# Patient Record
Sex: MALE | NOT HISPANIC OR LATINO | Employment: OTHER | URBAN - METROPOLITAN AREA
[De-identification: names, ages, dates, MRNs, and addresses within clinical notes are randomized per-mention and may not be internally consistent; named-entity substitution may affect disease eponyms.]

---

## 2021-04-26 ENCOUNTER — DOCUMENTATION ONLY (OUTPATIENT)
Dept: OTHER | Facility: CLINIC | Age: 81
End: 2021-04-26

## 2021-07-20 ENCOUNTER — LAB REQUISITION (OUTPATIENT)
Dept: LAB | Facility: CLINIC | Age: 81
End: 2021-07-20
Payer: COMMERCIAL

## 2021-07-20 DIAGNOSIS — E55.9 VITAMIN D DEFICIENCY, UNSPECIFIED: ICD-10-CM

## 2021-07-20 DIAGNOSIS — F02.80 DEMENTIA IN OTHER DISEASES CLASSIFIED ELSEWHERE WITHOUT BEHAVIORAL DISTURBANCE: ICD-10-CM

## 2021-07-21 LAB
ANION GAP SERPL CALCULATED.3IONS-SCNC: 7 MMOL/L (ref 3–14)
BUN SERPL-MCNC: 11 MG/DL (ref 7–30)
CALCIUM SERPL-MCNC: 8.7 MG/DL (ref 8.5–10.1)
CHLORIDE BLD-SCNC: 105 MMOL/L (ref 94–109)
CO2 SERPL-SCNC: 28 MMOL/L (ref 20–32)
CREAT SERPL-MCNC: 0.72 MG/DL (ref 0.66–1.25)
GFR SERPL CREATININE-BSD FRML MDRD: 89 ML/MIN/1.73M2
GLUCOSE BLD-MCNC: 70 MG/DL (ref 70–99)
POTASSIUM BLD-SCNC: 3.4 MMOL/L (ref 3.4–5.3)
SODIUM SERPL-SCNC: 140 MMOL/L (ref 133–144)
TSH SERPL DL<=0.005 MIU/L-ACNC: 2.32 MU/L (ref 0.4–4)

## 2021-07-21 PROCEDURE — 36415 COLL VENOUS BLD VENIPUNCTURE: CPT | Mod: ORL | Performed by: NURSE PRACTITIONER

## 2021-07-21 PROCEDURE — 82306 VITAMIN D 25 HYDROXY: CPT | Mod: ORL | Performed by: NURSE PRACTITIONER

## 2021-07-21 PROCEDURE — 80048 BASIC METABOLIC PNL TOTAL CA: CPT | Mod: ORL | Performed by: NURSE PRACTITIONER

## 2021-07-21 PROCEDURE — 84443 ASSAY THYROID STIM HORMONE: CPT | Mod: ORL | Performed by: NURSE PRACTITIONER

## 2021-07-22 LAB — DEPRECATED CALCIDIOL+CALCIFEROL SERPL-MC: 73 UG/L (ref 20–75)

## 2021-07-29 ENCOUNTER — LAB REQUISITION (OUTPATIENT)
Dept: LAB | Facility: CLINIC | Age: 81
End: 2021-07-29
Payer: COMMERCIAL

## 2021-07-29 DIAGNOSIS — Z29.9 ENCOUNTER FOR PROPHYLACTIC MEASURES, UNSPECIFIED: ICD-10-CM

## 2021-07-29 PROCEDURE — U0003 INFECTIOUS AGENT DETECTION BY NUCLEIC ACID (DNA OR RNA); SEVERE ACUTE RESPIRATORY SYNDROME CORONAVIRUS 2 (SARS-COV-2) (CORONAVIRUS DISEASE [COVID-19]), AMPLIFIED PROBE TECHNIQUE, MAKING USE OF HIGH THROUGHPUT TECHNOLOGIES AS DESCRIBED BY CMS-2020-01-R: HCPCS | Mod: ORL | Performed by: NURSE PRACTITIONER

## 2021-07-30 LAB — SARS-COV-2 RNA RESP QL NAA+PROBE: NEGATIVE

## 2021-08-02 ENCOUNTER — LAB REQUISITION (OUTPATIENT)
Dept: LAB | Facility: CLINIC | Age: 81
End: 2021-08-02
Payer: COMMERCIAL

## 2021-08-02 DIAGNOSIS — Z29.9 ENCOUNTER FOR PROPHYLACTIC MEASURES, UNSPECIFIED: ICD-10-CM

## 2021-08-02 PROCEDURE — U0003 INFECTIOUS AGENT DETECTION BY NUCLEIC ACID (DNA OR RNA); SEVERE ACUTE RESPIRATORY SYNDROME CORONAVIRUS 2 (SARS-COV-2) (CORONAVIRUS DISEASE [COVID-19]), AMPLIFIED PROBE TECHNIQUE, MAKING USE OF HIGH THROUGHPUT TECHNOLOGIES AS DESCRIBED BY CMS-2020-01-R: HCPCS | Mod: ORL | Performed by: NURSE PRACTITIONER

## 2021-08-03 LAB — SARS-COV-2 RNA RESP QL NAA+PROBE: NEGATIVE

## 2021-08-04 ENCOUNTER — LAB REQUISITION (OUTPATIENT)
Dept: LAB | Facility: CLINIC | Age: 81
End: 2021-08-04
Payer: COMMERCIAL

## 2021-08-04 DIAGNOSIS — Z29.9 ENCOUNTER FOR PROPHYLACTIC MEASURES, UNSPECIFIED: ICD-10-CM

## 2021-08-05 PROCEDURE — U0005 INFEC AGEN DETEC AMPLI PROBE: HCPCS | Mod: ORL | Performed by: NURSE PRACTITIONER

## 2021-08-06 LAB — SARS-COV-2 RNA RESP QL NAA+PROBE: NEGATIVE

## 2021-08-09 ENCOUNTER — LAB REQUISITION (OUTPATIENT)
Dept: LAB | Facility: CLINIC | Age: 81
End: 2021-08-09
Payer: COMMERCIAL

## 2021-08-09 DIAGNOSIS — Z29.9 ENCOUNTER FOR PROPHYLACTIC MEASURES, UNSPECIFIED: ICD-10-CM

## 2021-08-09 PROCEDURE — U0005 INFEC AGEN DETEC AMPLI PROBE: HCPCS | Mod: ORL | Performed by: NURSE PRACTITIONER

## 2021-08-10 LAB — SARS-COV-2 RNA RESP QL NAA+PROBE: NEGATIVE

## 2021-08-13 ENCOUNTER — LAB REQUISITION (OUTPATIENT)
Dept: LAB | Facility: CLINIC | Age: 81
End: 2021-08-13
Payer: COMMERCIAL

## 2021-08-13 DIAGNOSIS — Z29.9 ENCOUNTER FOR PROPHYLACTIC MEASURES, UNSPECIFIED: ICD-10-CM

## 2021-08-13 PROCEDURE — U0003 INFECTIOUS AGENT DETECTION BY NUCLEIC ACID (DNA OR RNA); SEVERE ACUTE RESPIRATORY SYNDROME CORONAVIRUS 2 (SARS-COV-2) (CORONAVIRUS DISEASE [COVID-19]), AMPLIFIED PROBE TECHNIQUE, MAKING USE OF HIGH THROUGHPUT TECHNOLOGIES AS DESCRIBED BY CMS-2020-01-R: HCPCS | Mod: ORL | Performed by: NURSE PRACTITIONER

## 2021-08-14 LAB — SARS-COV-2 RNA RESP QL NAA+PROBE: NEGATIVE

## 2021-08-16 ENCOUNTER — LAB REQUISITION (OUTPATIENT)
Dept: LAB | Facility: CLINIC | Age: 81
End: 2021-08-16
Payer: COMMERCIAL

## 2021-08-16 DIAGNOSIS — Z29.9 ENCOUNTER FOR PROPHYLACTIC MEASURES, UNSPECIFIED: ICD-10-CM

## 2021-08-16 PROCEDURE — U0005 INFEC AGEN DETEC AMPLI PROBE: HCPCS | Mod: ORL | Performed by: NURSE PRACTITIONER

## 2021-08-17 LAB — SARS-COV-2 RNA RESP QL NAA+PROBE: NEGATIVE

## 2021-08-18 ENCOUNTER — LAB REQUISITION (OUTPATIENT)
Dept: LAB | Facility: CLINIC | Age: 81
End: 2021-08-18
Payer: COMMERCIAL

## 2021-08-18 DIAGNOSIS — Z29.9 ENCOUNTER FOR PROPHYLACTIC MEASURES, UNSPECIFIED: ICD-10-CM

## 2021-08-19 PROCEDURE — U0003 INFECTIOUS AGENT DETECTION BY NUCLEIC ACID (DNA OR RNA); SEVERE ACUTE RESPIRATORY SYNDROME CORONAVIRUS 2 (SARS-COV-2) (CORONAVIRUS DISEASE [COVID-19]), AMPLIFIED PROBE TECHNIQUE, MAKING USE OF HIGH THROUGHPUT TECHNOLOGIES AS DESCRIBED BY CMS-2020-01-R: HCPCS | Mod: ORL | Performed by: NURSE PRACTITIONER

## 2021-08-20 LAB — SARS-COV-2 RNA RESP QL NAA+PROBE: NEGATIVE

## 2021-08-23 ENCOUNTER — LAB REQUISITION (OUTPATIENT)
Dept: LAB | Facility: CLINIC | Age: 81
End: 2021-08-23
Payer: COMMERCIAL

## 2021-08-23 DIAGNOSIS — Z29.9 ENCOUNTER FOR PROPHYLACTIC MEASURES, UNSPECIFIED: ICD-10-CM

## 2021-08-24 PROCEDURE — U0003 INFECTIOUS AGENT DETECTION BY NUCLEIC ACID (DNA OR RNA); SEVERE ACUTE RESPIRATORY SYNDROME CORONAVIRUS 2 (SARS-COV-2) (CORONAVIRUS DISEASE [COVID-19]), AMPLIFIED PROBE TECHNIQUE, MAKING USE OF HIGH THROUGHPUT TECHNOLOGIES AS DESCRIBED BY CMS-2020-01-R: HCPCS | Mod: ORL | Performed by: NURSE PRACTITIONER

## 2021-08-25 LAB — SARS-COV-2 RNA RESP QL NAA+PROBE: NEGATIVE

## 2021-08-26 ENCOUNTER — LAB REQUISITION (OUTPATIENT)
Dept: LAB | Facility: CLINIC | Age: 81
End: 2021-08-26
Payer: COMMERCIAL

## 2021-08-26 DIAGNOSIS — Z29.9 ENCOUNTER FOR PROPHYLACTIC MEASURES, UNSPECIFIED: ICD-10-CM

## 2021-08-30 ENCOUNTER — LAB REQUISITION (OUTPATIENT)
Dept: LAB | Facility: CLINIC | Age: 81
End: 2021-08-30
Payer: COMMERCIAL

## 2021-08-30 DIAGNOSIS — Z29.9 ENCOUNTER FOR PROPHYLACTIC MEASURES, UNSPECIFIED: ICD-10-CM

## 2021-08-30 PROCEDURE — U0005 INFEC AGEN DETEC AMPLI PROBE: HCPCS | Mod: ORL | Performed by: NURSE PRACTITIONER

## 2021-09-01 LAB — SARS-COV-2 RNA RESP QL NAA+PROBE: NEGATIVE

## 2021-09-15 ENCOUNTER — LAB REQUISITION (OUTPATIENT)
Dept: LAB | Facility: CLINIC | Age: 81
End: 2021-09-15
Payer: COMMERCIAL

## 2021-09-15 DIAGNOSIS — Z29.9 ENCOUNTER FOR PROPHYLACTIC MEASURES, UNSPECIFIED: ICD-10-CM

## 2021-09-15 PROCEDURE — U0005 INFEC AGEN DETEC AMPLI PROBE: HCPCS | Mod: ORL | Performed by: NURSE PRACTITIONER

## 2021-09-16 LAB — SARS-COV-2 RNA RESP QL NAA+PROBE: NEGATIVE

## 2021-09-20 ENCOUNTER — LAB REQUISITION (OUTPATIENT)
Dept: LAB | Facility: CLINIC | Age: 81
End: 2021-09-20
Payer: COMMERCIAL

## 2021-09-20 DIAGNOSIS — Z29.9 ENCOUNTER FOR PROPHYLACTIC MEASURES, UNSPECIFIED: ICD-10-CM

## 2021-09-21 PROCEDURE — U0003 INFECTIOUS AGENT DETECTION BY NUCLEIC ACID (DNA OR RNA); SEVERE ACUTE RESPIRATORY SYNDROME CORONAVIRUS 2 (SARS-COV-2) (CORONAVIRUS DISEASE [COVID-19]), AMPLIFIED PROBE TECHNIQUE, MAKING USE OF HIGH THROUGHPUT TECHNOLOGIES AS DESCRIBED BY CMS-2020-01-R: HCPCS | Mod: ORL | Performed by: NURSE PRACTITIONER

## 2021-09-24 LAB
SARS-COV-2 RNA RESP QL NAA+PROBE: NOT DETECTED
SPECIMEN SOURCE: NORMAL

## 2021-09-27 ENCOUNTER — LAB REQUISITION (OUTPATIENT)
Dept: LAB | Facility: CLINIC | Age: 81
End: 2021-09-27
Payer: COMMERCIAL

## 2021-09-27 DIAGNOSIS — Z29.9 ENCOUNTER FOR PROPHYLACTIC MEASURES, UNSPECIFIED: ICD-10-CM

## 2021-09-28 PROCEDURE — U0003 INFECTIOUS AGENT DETECTION BY NUCLEIC ACID (DNA OR RNA); SEVERE ACUTE RESPIRATORY SYNDROME CORONAVIRUS 2 (SARS-COV-2) (CORONAVIRUS DISEASE [COVID-19]), AMPLIFIED PROBE TECHNIQUE, MAKING USE OF HIGH THROUGHPUT TECHNOLOGIES AS DESCRIBED BY CMS-2020-01-R: HCPCS | Mod: ORL | Performed by: NURSE PRACTITIONER

## 2021-10-01 LAB — SARS-COV-2 RNA RESP QL NAA+PROBE: NOT DETECTED

## 2021-10-04 ENCOUNTER — LAB REQUISITION (OUTPATIENT)
Dept: LAB | Facility: CLINIC | Age: 81
End: 2021-10-04
Payer: COMMERCIAL

## 2021-10-04 DIAGNOSIS — Z29.9 ENCOUNTER FOR PROPHYLACTIC MEASURES, UNSPECIFIED: ICD-10-CM

## 2021-10-04 PROCEDURE — U0005 INFEC AGEN DETEC AMPLI PROBE: HCPCS | Mod: ORL | Performed by: NURSE PRACTITIONER

## 2021-10-05 LAB — SARS-COV-2 RNA RESP QL NAA+PROBE: NEGATIVE

## 2021-10-07 ENCOUNTER — LAB REQUISITION (OUTPATIENT)
Dept: LAB | Facility: CLINIC | Age: 81
End: 2021-10-07
Payer: COMMERCIAL

## 2021-10-07 DIAGNOSIS — Z29.9 ENCOUNTER FOR PROPHYLACTIC MEASURES, UNSPECIFIED: ICD-10-CM

## 2021-10-07 PROCEDURE — U0003 INFECTIOUS AGENT DETECTION BY NUCLEIC ACID (DNA OR RNA); SEVERE ACUTE RESPIRATORY SYNDROME CORONAVIRUS 2 (SARS-COV-2) (CORONAVIRUS DISEASE [COVID-19]), AMPLIFIED PROBE TECHNIQUE, MAKING USE OF HIGH THROUGHPUT TECHNOLOGIES AS DESCRIBED BY CMS-2020-01-R: HCPCS | Mod: ORL | Performed by: NURSE PRACTITIONER

## 2021-10-08 LAB — SARS-COV-2 RNA RESP QL NAA+PROBE: NEGATIVE

## 2021-10-11 ENCOUNTER — LAB REQUISITION (OUTPATIENT)
Dept: LAB | Facility: CLINIC | Age: 81
End: 2021-10-11
Payer: COMMERCIAL

## 2021-10-11 DIAGNOSIS — Z29.9 ENCOUNTER FOR PROPHYLACTIC MEASURES, UNSPECIFIED: ICD-10-CM

## 2021-10-11 PROCEDURE — U0005 INFEC AGEN DETEC AMPLI PROBE: HCPCS | Mod: ORL | Performed by: NURSE PRACTITIONER

## 2021-10-12 LAB — SARS-COV-2 RNA RESP QL NAA+PROBE: NEGATIVE

## 2021-10-13 ENCOUNTER — LAB REQUISITION (OUTPATIENT)
Dept: LAB | Facility: CLINIC | Age: 81
End: 2021-10-13
Payer: COMMERCIAL

## 2021-10-13 DIAGNOSIS — Z29.9 ENCOUNTER FOR PROPHYLACTIC MEASURES, UNSPECIFIED: ICD-10-CM

## 2021-10-14 PROCEDURE — U0003 INFECTIOUS AGENT DETECTION BY NUCLEIC ACID (DNA OR RNA); SEVERE ACUTE RESPIRATORY SYNDROME CORONAVIRUS 2 (SARS-COV-2) (CORONAVIRUS DISEASE [COVID-19]), AMPLIFIED PROBE TECHNIQUE, MAKING USE OF HIGH THROUGHPUT TECHNOLOGIES AS DESCRIBED BY CMS-2020-01-R: HCPCS | Mod: ORL | Performed by: NURSE PRACTITIONER

## 2021-10-15 LAB — SARS-COV-2 RNA RESP QL NAA+PROBE: NEGATIVE

## 2021-10-28 ENCOUNTER — LAB REQUISITION (OUTPATIENT)
Dept: LAB | Facility: CLINIC | Age: 81
End: 2021-10-28
Payer: COMMERCIAL

## 2021-10-28 DIAGNOSIS — Z29.9 ENCOUNTER FOR PROPHYLACTIC MEASURES, UNSPECIFIED: ICD-10-CM

## 2021-10-28 PROCEDURE — U0005 INFEC AGEN DETEC AMPLI PROBE: HCPCS | Mod: ORL | Performed by: NURSE PRACTITIONER

## 2021-10-29 LAB — SARS-COV-2 RNA RESP QL NAA+PROBE: NEGATIVE

## 2021-11-01 ENCOUNTER — LAB REQUISITION (OUTPATIENT)
Dept: LAB | Facility: CLINIC | Age: 81
End: 2021-11-01
Payer: COMMERCIAL

## 2021-11-01 DIAGNOSIS — Z29.9 ENCOUNTER FOR PROPHYLACTIC MEASURES, UNSPECIFIED: ICD-10-CM

## 2021-11-01 PROCEDURE — U0003 INFECTIOUS AGENT DETECTION BY NUCLEIC ACID (DNA OR RNA); SEVERE ACUTE RESPIRATORY SYNDROME CORONAVIRUS 2 (SARS-COV-2) (CORONAVIRUS DISEASE [COVID-19]), AMPLIFIED PROBE TECHNIQUE, MAKING USE OF HIGH THROUGHPUT TECHNOLOGIES AS DESCRIBED BY CMS-2020-01-R: HCPCS | Mod: ORL | Performed by: NURSE PRACTITIONER

## 2021-11-03 ENCOUNTER — LAB REQUISITION (OUTPATIENT)
Dept: LAB | Facility: CLINIC | Age: 81
End: 2021-11-03
Payer: COMMERCIAL

## 2021-11-03 DIAGNOSIS — Z29.9 ENCOUNTER FOR PROPHYLACTIC MEASURES, UNSPECIFIED: ICD-10-CM

## 2021-11-03 LAB — SARS-COV-2 RNA RESP QL NAA+PROBE: NOT DETECTED

## 2021-11-04 PROCEDURE — U0003 INFECTIOUS AGENT DETECTION BY NUCLEIC ACID (DNA OR RNA); SEVERE ACUTE RESPIRATORY SYNDROME CORONAVIRUS 2 (SARS-COV-2) (CORONAVIRUS DISEASE [COVID-19]), AMPLIFIED PROBE TECHNIQUE, MAKING USE OF HIGH THROUGHPUT TECHNOLOGIES AS DESCRIBED BY CMS-2020-01-R: HCPCS | Mod: ORL | Performed by: NURSE PRACTITIONER

## 2021-11-06 LAB — SARS-COV-2 RNA RESP QL NAA+PROBE: NOT DETECTED

## 2021-11-07 ENCOUNTER — LAB REQUISITION (OUTPATIENT)
Dept: LAB | Facility: CLINIC | Age: 81
End: 2021-11-07
Payer: COMMERCIAL

## 2021-11-07 DIAGNOSIS — Z29.9 ENCOUNTER FOR PROPHYLACTIC MEASURES, UNSPECIFIED: ICD-10-CM

## 2021-11-08 PROCEDURE — U0003 INFECTIOUS AGENT DETECTION BY NUCLEIC ACID (DNA OR RNA); SEVERE ACUTE RESPIRATORY SYNDROME CORONAVIRUS 2 (SARS-COV-2) (CORONAVIRUS DISEASE [COVID-19]), AMPLIFIED PROBE TECHNIQUE, MAKING USE OF HIGH THROUGHPUT TECHNOLOGIES AS DESCRIBED BY CMS-2020-01-R: HCPCS | Mod: ORL | Performed by: NURSE PRACTITIONER

## 2021-11-10 ENCOUNTER — LAB REQUISITION (OUTPATIENT)
Dept: LAB | Facility: CLINIC | Age: 81
End: 2021-11-10
Payer: COMMERCIAL

## 2021-11-10 DIAGNOSIS — Z29.9 ENCOUNTER FOR PROPHYLACTIC MEASURES, UNSPECIFIED: ICD-10-CM

## 2021-11-11 LAB — SARS-COV-2 RNA RESP QL NAA+PROBE: NOT DETECTED

## 2021-11-11 PROCEDURE — U0003 INFECTIOUS AGENT DETECTION BY NUCLEIC ACID (DNA OR RNA); SEVERE ACUTE RESPIRATORY SYNDROME CORONAVIRUS 2 (SARS-COV-2) (CORONAVIRUS DISEASE [COVID-19]), AMPLIFIED PROBE TECHNIQUE, MAKING USE OF HIGH THROUGHPUT TECHNOLOGIES AS DESCRIBED BY CMS-2020-01-R: HCPCS | Mod: ORL | Performed by: NURSE PRACTITIONER

## 2021-11-13 LAB — SARS-COV-2 RNA RESP QL NAA+PROBE: NEGATIVE

## 2021-12-15 ENCOUNTER — LAB REQUISITION (OUTPATIENT)
Dept: LAB | Facility: CLINIC | Age: 81
End: 2021-12-15
Payer: COMMERCIAL

## 2021-12-15 DIAGNOSIS — Z29.9 ENCOUNTER FOR PROPHYLACTIC MEASURES, UNSPECIFIED: ICD-10-CM

## 2021-12-15 PROCEDURE — U0003 INFECTIOUS AGENT DETECTION BY NUCLEIC ACID (DNA OR RNA); SEVERE ACUTE RESPIRATORY SYNDROME CORONAVIRUS 2 (SARS-COV-2) (CORONAVIRUS DISEASE [COVID-19]), AMPLIFIED PROBE TECHNIQUE, MAKING USE OF HIGH THROUGHPUT TECHNOLOGIES AS DESCRIBED BY CMS-2020-01-R: HCPCS | Mod: ORL | Performed by: NURSE PRACTITIONER

## 2021-12-16 LAB — SARS-COV-2 RNA RESP QL NAA+PROBE: NEGATIVE

## 2021-12-20 ENCOUNTER — LAB REQUISITION (OUTPATIENT)
Dept: LAB | Facility: CLINIC | Age: 81
End: 2021-12-20
Payer: COMMERCIAL

## 2021-12-20 DIAGNOSIS — Z29.9 ENCOUNTER FOR PROPHYLACTIC MEASURES, UNSPECIFIED: ICD-10-CM

## 2021-12-21 PROCEDURE — U0003 INFECTIOUS AGENT DETECTION BY NUCLEIC ACID (DNA OR RNA); SEVERE ACUTE RESPIRATORY SYNDROME CORONAVIRUS 2 (SARS-COV-2) (CORONAVIRUS DISEASE [COVID-19]), AMPLIFIED PROBE TECHNIQUE, MAKING USE OF HIGH THROUGHPUT TECHNOLOGIES AS DESCRIBED BY CMS-2020-01-R: HCPCS | Mod: ORL | Performed by: NURSE PRACTITIONER

## 2021-12-22 LAB — SARS-COV-2 RNA RESP QL NAA+PROBE: NEGATIVE

## 2021-12-30 ENCOUNTER — LAB REQUISITION (OUTPATIENT)
Dept: LAB | Facility: CLINIC | Age: 81
End: 2021-12-30
Payer: COMMERCIAL

## 2021-12-30 DIAGNOSIS — Z29.9 ENCOUNTER FOR PROPHYLACTIC MEASURES, UNSPECIFIED: ICD-10-CM

## 2021-12-30 PROCEDURE — U0003 INFECTIOUS AGENT DETECTION BY NUCLEIC ACID (DNA OR RNA); SEVERE ACUTE RESPIRATORY SYNDROME CORONAVIRUS 2 (SARS-COV-2) (CORONAVIRUS DISEASE [COVID-19]), AMPLIFIED PROBE TECHNIQUE, MAKING USE OF HIGH THROUGHPUT TECHNOLOGIES AS DESCRIBED BY CMS-2020-01-R: HCPCS | Mod: ORL | Performed by: NURSE PRACTITIONER

## 2021-12-31 LAB — SARS-COV-2 RNA RESP QL NAA+PROBE: NEGATIVE

## 2022-01-04 ENCOUNTER — LAB REQUISITION (OUTPATIENT)
Dept: LAB | Facility: CLINIC | Age: 82
End: 2022-01-04
Payer: COMMERCIAL

## 2022-01-04 DIAGNOSIS — Z29.9 ENCOUNTER FOR PROPHYLACTIC MEASURES, UNSPECIFIED: ICD-10-CM

## 2022-01-04 PROCEDURE — U0003 INFECTIOUS AGENT DETECTION BY NUCLEIC ACID (DNA OR RNA); SEVERE ACUTE RESPIRATORY SYNDROME CORONAVIRUS 2 (SARS-COV-2) (CORONAVIRUS DISEASE [COVID-19]), AMPLIFIED PROBE TECHNIQUE, MAKING USE OF HIGH THROUGHPUT TECHNOLOGIES AS DESCRIBED BY CMS-2020-01-R: HCPCS | Mod: ORL | Performed by: NURSE PRACTITIONER

## 2022-01-05 LAB — SARS-COV-2 RNA RESP QL NAA+PROBE: NEGATIVE

## 2022-01-11 ENCOUNTER — LAB REQUISITION (OUTPATIENT)
Dept: LAB | Facility: CLINIC | Age: 82
End: 2022-01-11
Payer: COMMERCIAL

## 2022-01-11 DIAGNOSIS — Z29.9 ENCOUNTER FOR PROPHYLACTIC MEASURES, UNSPECIFIED: ICD-10-CM

## 2022-01-11 PROCEDURE — U0003 INFECTIOUS AGENT DETECTION BY NUCLEIC ACID (DNA OR RNA); SEVERE ACUTE RESPIRATORY SYNDROME CORONAVIRUS 2 (SARS-COV-2) (CORONAVIRUS DISEASE [COVID-19]), AMPLIFIED PROBE TECHNIQUE, MAKING USE OF HIGH THROUGHPUT TECHNOLOGIES AS DESCRIBED BY CMS-2020-01-R: HCPCS | Mod: ORL | Performed by: NURSE PRACTITIONER

## 2022-01-12 LAB — SARS-COV-2 RNA RESP QL NAA+PROBE: NEGATIVE

## 2022-01-17 ENCOUNTER — LAB REQUISITION (OUTPATIENT)
Dept: LAB | Facility: CLINIC | Age: 82
End: 2022-01-17
Payer: COMMERCIAL

## 2022-01-17 DIAGNOSIS — Z29.9 ENCOUNTER FOR PROPHYLACTIC MEASURES, UNSPECIFIED: ICD-10-CM

## 2022-01-18 PROCEDURE — U0003 INFECTIOUS AGENT DETECTION BY NUCLEIC ACID (DNA OR RNA); SEVERE ACUTE RESPIRATORY SYNDROME CORONAVIRUS 2 (SARS-COV-2) (CORONAVIRUS DISEASE [COVID-19]), AMPLIFIED PROBE TECHNIQUE, MAKING USE OF HIGH THROUGHPUT TECHNOLOGIES AS DESCRIBED BY CMS-2020-01-R: HCPCS | Mod: ORL | Performed by: NURSE PRACTITIONER

## 2022-01-19 LAB — SARS-COV-2 RNA RESP QL NAA+PROBE: NEGATIVE

## 2022-01-24 ENCOUNTER — LAB REQUISITION (OUTPATIENT)
Dept: LAB | Facility: CLINIC | Age: 82
End: 2022-01-24
Payer: COMMERCIAL

## 2022-01-24 DIAGNOSIS — Z29.9 ENCOUNTER FOR PROPHYLACTIC MEASURES, UNSPECIFIED: ICD-10-CM

## 2022-01-25 PROCEDURE — U0005 INFEC AGEN DETEC AMPLI PROBE: HCPCS | Mod: ORL | Performed by: NURSE PRACTITIONER

## 2022-01-26 LAB — SARS-COV-2 RNA RESP QL NAA+PROBE: NEGATIVE

## 2022-01-31 ENCOUNTER — LAB REQUISITION (OUTPATIENT)
Dept: LAB | Facility: CLINIC | Age: 82
End: 2022-01-31
Payer: COMMERCIAL

## 2022-01-31 DIAGNOSIS — Z29.9 ENCOUNTER FOR PROPHYLACTIC MEASURES, UNSPECIFIED: ICD-10-CM

## 2022-02-01 PROCEDURE — U0005 INFEC AGEN DETEC AMPLI PROBE: HCPCS | Mod: ORL | Performed by: NURSE PRACTITIONER

## 2022-02-02 LAB — SARS-COV-2 RNA RESP QL NAA+PROBE: NEGATIVE

## 2022-03-17 ENCOUNTER — LAB REQUISITION (OUTPATIENT)
Dept: LAB | Facility: CLINIC | Age: 82
End: 2022-03-17
Payer: COMMERCIAL

## 2022-03-17 DIAGNOSIS — F03.90 UNSPECIFIED DEMENTIA WITHOUT BEHAVIORAL DISTURBANCE: ICD-10-CM

## 2022-03-18 LAB
ANION GAP SERPL CALCULATED.3IONS-SCNC: 8 MMOL/L (ref 3–14)
BASOPHILS # BLD AUTO: 0 10E3/UL (ref 0–0.2)
BASOPHILS NFR BLD AUTO: 0 %
BUN SERPL-MCNC: 29 MG/DL (ref 7–30)
CALCIUM SERPL-MCNC: 8.9 MG/DL (ref 8.5–10.1)
CHLORIDE BLD-SCNC: 105 MMOL/L (ref 94–109)
CO2 SERPL-SCNC: 25 MMOL/L (ref 20–32)
CREAT SERPL-MCNC: 0.99 MG/DL (ref 0.66–1.25)
EOSINOPHIL # BLD AUTO: 0 10E3/UL (ref 0–0.7)
EOSINOPHIL NFR BLD AUTO: 0 %
ERYTHROCYTE [DISTWIDTH] IN BLOOD BY AUTOMATED COUNT: 12.2 % (ref 10–15)
GFR SERPL CREATININE-BSD FRML MDRD: 77 ML/MIN/1.73M2
GLUCOSE BLD-MCNC: 154 MG/DL (ref 70–99)
HCT VFR BLD AUTO: 35.8 % (ref 40–53)
HGB BLD-MCNC: 11.3 G/DL (ref 13.3–17.7)
IMM GRANULOCYTES # BLD: 0.2 10E3/UL
IMM GRANULOCYTES NFR BLD: 1 %
LYMPHOCYTES # BLD AUTO: 1.3 10E3/UL (ref 0.8–5.3)
LYMPHOCYTES NFR BLD AUTO: 7 %
MCH RBC QN AUTO: 32.4 PG (ref 26.5–33)
MCHC RBC AUTO-ENTMCNC: 31.6 G/DL (ref 31.5–36.5)
MCV RBC AUTO: 103 FL (ref 78–100)
MONOCYTES # BLD AUTO: 0.7 10E3/UL (ref 0–1.3)
MONOCYTES NFR BLD AUTO: 4 %
NEUTROPHILS # BLD AUTO: 15.7 10E3/UL (ref 1.6–8.3)
NEUTROPHILS NFR BLD AUTO: 88 %
NRBC # BLD AUTO: 0 10E3/UL
NRBC BLD AUTO-RTO: 0 /100
PLATELET # BLD AUTO: 151 10E3/UL (ref 150–450)
POTASSIUM BLD-SCNC: 3.6 MMOL/L (ref 3.4–5.3)
RBC # BLD AUTO: 3.49 10E6/UL (ref 4.4–5.9)
SODIUM SERPL-SCNC: 138 MMOL/L (ref 133–144)
WBC # BLD AUTO: 18 10E3/UL (ref 4–11)

## 2022-03-18 PROCEDURE — 36415 COLL VENOUS BLD VENIPUNCTURE: CPT | Mod: ORL | Performed by: NURSE PRACTITIONER

## 2022-03-18 PROCEDURE — P9604 ONE-WAY ALLOW PRORATED TRIP: HCPCS | Mod: ORL | Performed by: NURSE PRACTITIONER

## 2022-03-18 PROCEDURE — 80048 BASIC METABOLIC PNL TOTAL CA: CPT | Mod: ORL | Performed by: NURSE PRACTITIONER

## 2022-03-18 PROCEDURE — 85025 COMPLETE CBC W/AUTO DIFF WBC: CPT | Mod: ORL | Performed by: NURSE PRACTITIONER

## 2022-03-21 ENCOUNTER — LAB REQUISITION (OUTPATIENT)
Dept: LAB | Facility: CLINIC | Age: 82
End: 2022-03-21
Payer: COMMERCIAL

## 2022-03-21 DIAGNOSIS — F03.90 UNSPECIFIED DEMENTIA WITHOUT BEHAVIORAL DISTURBANCE: ICD-10-CM

## 2022-03-22 LAB
BASOPHILS # BLD AUTO: 0 10E3/UL (ref 0–0.2)
BASOPHILS NFR BLD AUTO: 0 %
EOSINOPHIL # BLD AUTO: 0.1 10E3/UL (ref 0–0.7)
EOSINOPHIL NFR BLD AUTO: 1 %
ERYTHROCYTE [DISTWIDTH] IN BLOOD BY AUTOMATED COUNT: 12.6 % (ref 10–15)
HCT VFR BLD AUTO: 30.7 % (ref 40–53)
HGB BLD-MCNC: 9.7 G/DL (ref 13.3–17.7)
IMM GRANULOCYTES # BLD: 0.1 10E3/UL
IMM GRANULOCYTES NFR BLD: 1 %
LYMPHOCYTES # BLD AUTO: 1.2 10E3/UL (ref 0.8–5.3)
LYMPHOCYTES NFR BLD AUTO: 12 %
MCH RBC QN AUTO: 33.1 PG (ref 26.5–33)
MCHC RBC AUTO-ENTMCNC: 31.6 G/DL (ref 31.5–36.5)
MCV RBC AUTO: 105 FL (ref 78–100)
MONOCYTES # BLD AUTO: 0.7 10E3/UL (ref 0–1.3)
MONOCYTES NFR BLD AUTO: 7 %
NEUTROPHILS # BLD AUTO: 8.4 10E3/UL (ref 1.6–8.3)
NEUTROPHILS NFR BLD AUTO: 79 %
NRBC # BLD AUTO: 0 10E3/UL
NRBC BLD AUTO-RTO: 0 /100
PLATELET # BLD AUTO: 229 10E3/UL (ref 150–450)
RBC # BLD AUTO: 2.93 10E6/UL (ref 4.4–5.9)
WBC # BLD AUTO: 10.5 10E3/UL (ref 4–11)

## 2022-03-22 PROCEDURE — 85025 COMPLETE CBC W/AUTO DIFF WBC: CPT | Mod: ORL | Performed by: NURSE PRACTITIONER

## 2022-03-22 PROCEDURE — 36415 COLL VENOUS BLD VENIPUNCTURE: CPT | Mod: ORL | Performed by: NURSE PRACTITIONER

## 2022-03-22 PROCEDURE — P9604 ONE-WAY ALLOW PRORATED TRIP: HCPCS | Mod: ORL | Performed by: NURSE PRACTITIONER

## 2022-04-04 ENCOUNTER — LAB REQUISITION (OUTPATIENT)
Dept: LAB | Facility: CLINIC | Age: 82
End: 2022-04-04
Payer: COMMERCIAL

## 2022-04-04 DIAGNOSIS — F03.90 UNSPECIFIED DEMENTIA WITHOUT BEHAVIORAL DISTURBANCE: ICD-10-CM

## 2022-04-04 DIAGNOSIS — D64.9 ANEMIA, UNSPECIFIED: ICD-10-CM

## 2022-04-05 LAB
ANION GAP SERPL CALCULATED.3IONS-SCNC: 5 MMOL/L (ref 3–14)
BASOPHILS # BLD AUTO: 0 10E3/UL (ref 0–0.2)
BASOPHILS NFR BLD AUTO: 1 %
BUN SERPL-MCNC: 13 MG/DL (ref 7–30)
CALCIUM SERPL-MCNC: 8.9 MG/DL (ref 8.5–10.1)
CHLORIDE BLD-SCNC: 107 MMOL/L (ref 94–109)
CO2 SERPL-SCNC: 25 MMOL/L (ref 20–32)
CREAT SERPL-MCNC: 0.78 MG/DL (ref 0.66–1.25)
EOSINOPHIL # BLD AUTO: 0.1 10E3/UL (ref 0–0.7)
EOSINOPHIL NFR BLD AUTO: 1 %
ERYTHROCYTE [DISTWIDTH] IN BLOOD BY AUTOMATED COUNT: 13.2 % (ref 10–15)
GFR SERPL CREATININE-BSD FRML MDRD: 90 ML/MIN/1.73M2
GLUCOSE BLD-MCNC: 99 MG/DL (ref 70–99)
HCT VFR BLD AUTO: 34.8 % (ref 40–53)
HGB BLD-MCNC: 11.1 G/DL (ref 13.3–17.7)
IMM GRANULOCYTES # BLD: 0 10E3/UL
IMM GRANULOCYTES NFR BLD: 0 %
LYMPHOCYTES # BLD AUTO: 2.1 10E3/UL (ref 0.8–5.3)
LYMPHOCYTES NFR BLD AUTO: 27 %
MCH RBC QN AUTO: 32.6 PG (ref 26.5–33)
MCHC RBC AUTO-ENTMCNC: 31.9 G/DL (ref 31.5–36.5)
MCV RBC AUTO: 102 FL (ref 78–100)
MONOCYTES # BLD AUTO: 0.3 10E3/UL (ref 0–1.3)
MONOCYTES NFR BLD AUTO: 4 %
NEUTROPHILS # BLD AUTO: 5.2 10E3/UL (ref 1.6–8.3)
NEUTROPHILS NFR BLD AUTO: 67 %
NRBC # BLD AUTO: 0 10E3/UL
NRBC BLD AUTO-RTO: 0 /100
PLATELET # BLD AUTO: 224 10E3/UL (ref 150–450)
POTASSIUM BLD-SCNC: 3.5 MMOL/L (ref 3.4–5.3)
RBC # BLD AUTO: 3.4 10E6/UL (ref 4.4–5.9)
SODIUM SERPL-SCNC: 137 MMOL/L (ref 133–144)
WBC # BLD AUTO: 7.8 10E3/UL (ref 4–11)

## 2022-04-05 PROCEDURE — 80048 BASIC METABOLIC PNL TOTAL CA: CPT | Mod: ORL | Performed by: NURSE PRACTITIONER

## 2022-04-05 PROCEDURE — P9604 ONE-WAY ALLOW PRORATED TRIP: HCPCS | Mod: ORL | Performed by: NURSE PRACTITIONER

## 2022-04-05 PROCEDURE — 85025 COMPLETE CBC W/AUTO DIFF WBC: CPT | Mod: ORL | Performed by: NURSE PRACTITIONER

## 2022-04-05 PROCEDURE — 36415 COLL VENOUS BLD VENIPUNCTURE: CPT | Mod: ORL | Performed by: NURSE PRACTITIONER

## 2022-04-21 ENCOUNTER — LAB REQUISITION (OUTPATIENT)
Dept: LAB | Facility: CLINIC | Age: 82
End: 2022-04-21
Payer: COMMERCIAL

## 2022-04-21 DIAGNOSIS — Z29.9 ENCOUNTER FOR PROPHYLACTIC MEASURES, UNSPECIFIED: ICD-10-CM

## 2022-04-21 PROCEDURE — U0003 INFECTIOUS AGENT DETECTION BY NUCLEIC ACID (DNA OR RNA); SEVERE ACUTE RESPIRATORY SYNDROME CORONAVIRUS 2 (SARS-COV-2) (CORONAVIRUS DISEASE [COVID-19]), AMPLIFIED PROBE TECHNIQUE, MAKING USE OF HIGH THROUGHPUT TECHNOLOGIES AS DESCRIBED BY CMS-2020-01-R: HCPCS | Mod: ORL | Performed by: NURSE PRACTITIONER

## 2022-04-22 LAB — SARS-COV-2 RNA RESP QL NAA+PROBE: NEGATIVE

## 2022-04-25 ENCOUNTER — LAB REQUISITION (OUTPATIENT)
Dept: LAB | Facility: CLINIC | Age: 82
End: 2022-04-25
Payer: COMMERCIAL

## 2022-04-25 DIAGNOSIS — Z29.9 ENCOUNTER FOR PROPHYLACTIC MEASURES, UNSPECIFIED: ICD-10-CM

## 2022-04-25 PROCEDURE — U0003 INFECTIOUS AGENT DETECTION BY NUCLEIC ACID (DNA OR RNA); SEVERE ACUTE RESPIRATORY SYNDROME CORONAVIRUS 2 (SARS-COV-2) (CORONAVIRUS DISEASE [COVID-19]), AMPLIFIED PROBE TECHNIQUE, MAKING USE OF HIGH THROUGHPUT TECHNOLOGIES AS DESCRIBED BY CMS-2020-01-R: HCPCS | Mod: ORL | Performed by: NURSE PRACTITIONER

## 2022-04-26 LAB — SARS-COV-2 RNA RESP QL NAA+PROBE: NEGATIVE

## 2022-04-27 ENCOUNTER — LAB REQUISITION (OUTPATIENT)
Dept: LAB | Facility: CLINIC | Age: 82
End: 2022-04-27
Payer: COMMERCIAL

## 2022-04-27 DIAGNOSIS — Z29.9 ENCOUNTER FOR PROPHYLACTIC MEASURES, UNSPECIFIED: ICD-10-CM

## 2022-04-28 PROCEDURE — U0003 INFECTIOUS AGENT DETECTION BY NUCLEIC ACID (DNA OR RNA); SEVERE ACUTE RESPIRATORY SYNDROME CORONAVIRUS 2 (SARS-COV-2) (CORONAVIRUS DISEASE [COVID-19]), AMPLIFIED PROBE TECHNIQUE, MAKING USE OF HIGH THROUGHPUT TECHNOLOGIES AS DESCRIBED BY CMS-2020-01-R: HCPCS | Mod: ORL | Performed by: NURSE PRACTITIONER

## 2022-04-29 LAB — SARS-COV-2 RNA RESP QL NAA+PROBE: NEGATIVE

## 2022-05-03 ENCOUNTER — LAB REQUISITION (OUTPATIENT)
Dept: LAB | Facility: CLINIC | Age: 82
End: 2022-05-03
Payer: COMMERCIAL

## 2022-05-03 DIAGNOSIS — Z29.9 ENCOUNTER FOR PROPHYLACTIC MEASURES, UNSPECIFIED: ICD-10-CM

## 2022-05-03 PROCEDURE — U0005 INFEC AGEN DETEC AMPLI PROBE: HCPCS | Mod: ORL | Performed by: NURSE PRACTITIONER

## 2022-05-04 LAB — SARS-COV-2 RNA RESP QL NAA+PROBE: NEGATIVE

## 2022-05-05 ENCOUNTER — LAB REQUISITION (OUTPATIENT)
Dept: LAB | Facility: CLINIC | Age: 82
End: 2022-05-05
Payer: COMMERCIAL

## 2022-05-05 DIAGNOSIS — Z29.9 ENCOUNTER FOR PROPHYLACTIC MEASURES, UNSPECIFIED: ICD-10-CM

## 2022-05-06 PROCEDURE — U0005 INFEC AGEN DETEC AMPLI PROBE: HCPCS | Mod: ORL | Performed by: NURSE PRACTITIONER

## 2022-05-07 LAB — SARS-COV-2 RNA RESP QL NAA+PROBE: NEGATIVE

## 2022-05-09 ENCOUNTER — LAB REQUISITION (OUTPATIENT)
Dept: LAB | Facility: CLINIC | Age: 82
End: 2022-05-09
Payer: COMMERCIAL

## 2022-05-09 DIAGNOSIS — Z29.9 ENCOUNTER FOR PROPHYLACTIC MEASURES, UNSPECIFIED: ICD-10-CM

## 2022-05-09 PROCEDURE — U0005 INFEC AGEN DETEC AMPLI PROBE: HCPCS | Mod: ORL | Performed by: NURSE PRACTITIONER

## 2022-05-10 LAB — SARS-COV-2 RNA RESP QL NAA+PROBE: NEGATIVE

## 2022-05-11 ENCOUNTER — LAB REQUISITION (OUTPATIENT)
Dept: LAB | Facility: CLINIC | Age: 82
End: 2022-05-11
Payer: COMMERCIAL

## 2022-05-11 DIAGNOSIS — Z29.9 ENCOUNTER FOR PROPHYLACTIC MEASURES, UNSPECIFIED: ICD-10-CM

## 2022-05-12 PROCEDURE — U0005 INFEC AGEN DETEC AMPLI PROBE: HCPCS | Mod: ORL | Performed by: NURSE PRACTITIONER

## 2022-05-13 LAB — SARS-COV-2 RNA RESP QL NAA+PROBE: NEGATIVE

## 2022-05-16 ENCOUNTER — LAB REQUISITION (OUTPATIENT)
Dept: LAB | Facility: CLINIC | Age: 82
End: 2022-05-16
Payer: COMMERCIAL

## 2022-05-16 DIAGNOSIS — Z29.9 ENCOUNTER FOR PROPHYLACTIC MEASURES, UNSPECIFIED: ICD-10-CM

## 2022-05-16 PROCEDURE — U0005 INFEC AGEN DETEC AMPLI PROBE: HCPCS | Mod: ORL | Performed by: NURSE PRACTITIONER

## 2022-05-17 LAB — SARS-COV-2 RNA RESP QL NAA+PROBE: NEGATIVE

## 2022-05-18 ENCOUNTER — LAB REQUISITION (OUTPATIENT)
Dept: LAB | Facility: CLINIC | Age: 82
End: 2022-05-18
Payer: COMMERCIAL

## 2022-05-18 DIAGNOSIS — Z29.9 ENCOUNTER FOR PROPHYLACTIC MEASURES, UNSPECIFIED: ICD-10-CM

## 2022-05-19 PROCEDURE — U0005 INFEC AGEN DETEC AMPLI PROBE: HCPCS | Mod: ORL | Performed by: NURSE PRACTITIONER

## 2022-05-20 LAB — SARS-COV-2 RNA RESP QL NAA+PROBE: NEGATIVE

## 2022-05-23 ENCOUNTER — LAB REQUISITION (OUTPATIENT)
Dept: LAB | Facility: CLINIC | Age: 82
End: 2022-05-23
Payer: COMMERCIAL

## 2022-05-23 DIAGNOSIS — Z29.9 ENCOUNTER FOR PROPHYLACTIC MEASURES, UNSPECIFIED: ICD-10-CM

## 2022-05-23 PROCEDURE — U0005 INFEC AGEN DETEC AMPLI PROBE: HCPCS | Mod: ORL | Performed by: NURSE PRACTITIONER

## 2022-05-24 LAB — SARS-COV-2 RNA RESP QL NAA+PROBE: POSITIVE

## 2022-08-03 ENCOUNTER — LAB REQUISITION (OUTPATIENT)
Dept: LAB | Facility: CLINIC | Age: 82
End: 2022-08-03
Payer: COMMERCIAL

## 2022-08-03 DIAGNOSIS — R53.1 WEAKNESS: ICD-10-CM

## 2022-08-04 LAB
ANION GAP SERPL CALCULATED.3IONS-SCNC: 10 MMOL/L (ref 7–15)
BASOPHILS # BLD AUTO: 0 10E3/UL (ref 0–0.2)
BASOPHILS NFR BLD AUTO: 0 %
BUN SERPL-MCNC: 11.4 MG/DL (ref 8–23)
CALCIUM SERPL-MCNC: 9.2 MG/DL (ref 8.8–10.2)
CHLORIDE SERPL-SCNC: 102 MMOL/L (ref 98–107)
CREAT SERPL-MCNC: 0.72 MG/DL (ref 0.67–1.17)
DEPRECATED HCO3 PLAS-SCNC: 26 MMOL/L (ref 22–29)
EOSINOPHIL # BLD AUTO: 0.1 10E3/UL (ref 0–0.7)
EOSINOPHIL NFR BLD AUTO: 2 %
ERYTHROCYTE [DISTWIDTH] IN BLOOD BY AUTOMATED COUNT: 12.2 % (ref 10–15)
GFR SERPL CREATININE-BSD FRML MDRD: >90 ML/MIN/1.73M2
GLUCOSE SERPL-MCNC: 79 MG/DL (ref 70–99)
HCT VFR BLD AUTO: 35.9 % (ref 40–53)
HGB BLD-MCNC: 11.7 G/DL (ref 13.3–17.7)
IMM GRANULOCYTES # BLD: 0 10E3/UL
IMM GRANULOCYTES NFR BLD: 0 %
LYMPHOCYTES # BLD AUTO: 2.2 10E3/UL (ref 0.8–5.3)
LYMPHOCYTES NFR BLD AUTO: 41 %
MCH RBC QN AUTO: 32.5 PG (ref 26.5–33)
MCHC RBC AUTO-ENTMCNC: 32.6 G/DL (ref 31.5–36.5)
MCV RBC AUTO: 100 FL (ref 78–100)
MONOCYTES # BLD AUTO: 0.3 10E3/UL (ref 0–1.3)
MONOCYTES NFR BLD AUTO: 6 %
NEUTROPHILS # BLD AUTO: 2.7 10E3/UL (ref 1.6–8.3)
NEUTROPHILS NFR BLD AUTO: 51 %
NRBC # BLD AUTO: 0 10E3/UL
NRBC BLD AUTO-RTO: 0 /100
PLATELET # BLD AUTO: 183 10E3/UL (ref 150–450)
POTASSIUM SERPL-SCNC: 3.5 MMOL/L (ref 3.4–5.3)
RBC # BLD AUTO: 3.6 10E6/UL (ref 4.4–5.9)
SODIUM SERPL-SCNC: 138 MMOL/L (ref 136–145)
WBC # BLD AUTO: 5.3 10E3/UL (ref 4–11)

## 2022-08-04 PROCEDURE — 80048 BASIC METABOLIC PNL TOTAL CA: CPT | Mod: ORL | Performed by: INTERNAL MEDICINE

## 2022-08-04 PROCEDURE — 85025 COMPLETE CBC W/AUTO DIFF WBC: CPT | Mod: ORL | Performed by: INTERNAL MEDICINE

## 2022-08-04 PROCEDURE — P9604 ONE-WAY ALLOW PRORATED TRIP: HCPCS | Mod: ORL | Performed by: INTERNAL MEDICINE

## 2022-08-04 PROCEDURE — 36415 COLL VENOUS BLD VENIPUNCTURE: CPT | Mod: ORL | Performed by: INTERNAL MEDICINE

## 2022-09-14 ENCOUNTER — LAB REQUISITION (OUTPATIENT)
Dept: LAB | Facility: CLINIC | Age: 82
End: 2022-09-14
Payer: COMMERCIAL

## 2022-09-14 DIAGNOSIS — F03.90 UNSPECIFIED DEMENTIA WITHOUT BEHAVIORAL DISTURBANCE: ICD-10-CM

## 2022-09-14 DIAGNOSIS — I15.1 HYPERTENSION SECONDARY TO OTHER RENAL DISORDERS (CODE): ICD-10-CM

## 2022-09-15 LAB
ANION GAP SERPL CALCULATED.3IONS-SCNC: 6 MMOL/L (ref 3–14)
BUN SERPL-MCNC: 13 MG/DL (ref 7–30)
CALCIUM SERPL-MCNC: 8.8 MG/DL (ref 8.5–10.1)
CHLORIDE BLD-SCNC: 108 MMOL/L (ref 94–109)
CO2 SERPL-SCNC: 26 MMOL/L (ref 20–32)
CREAT SERPL-MCNC: 0.61 MG/DL (ref 0.66–1.25)
GFR SERPL CREATININE-BSD FRML MDRD: >90 ML/MIN/1.73M2
GLUCOSE BLD-MCNC: 70 MG/DL (ref 70–99)
POTASSIUM BLD-SCNC: 3.8 MMOL/L (ref 3.4–5.3)
SODIUM SERPL-SCNC: 140 MMOL/L (ref 133–144)

## 2022-09-15 PROCEDURE — P9604 ONE-WAY ALLOW PRORATED TRIP: HCPCS | Mod: ORL | Performed by: NURSE PRACTITIONER

## 2022-09-15 PROCEDURE — 36415 COLL VENOUS BLD VENIPUNCTURE: CPT | Mod: ORL | Performed by: NURSE PRACTITIONER

## 2022-09-15 PROCEDURE — 80048 BASIC METABOLIC PNL TOTAL CA: CPT | Mod: ORL | Performed by: NURSE PRACTITIONER

## 2022-10-20 ENCOUNTER — LAB REQUISITION (OUTPATIENT)
Dept: LAB | Facility: CLINIC | Age: 82
End: 2022-10-20
Payer: COMMERCIAL

## 2022-10-20 DIAGNOSIS — F02.80 DEMENTIA IN OTHER DISEASES CLASSIFIED ELSEWHERE, UNSPECIFIED SEVERITY, WITHOUT BEHAVIORAL DISTURBANCE, PSYCHOTIC DISTURBANCE, MOOD DISTURBANCE, AND ANXIETY (H): ICD-10-CM

## 2022-10-21 LAB
DEPRECATED CALCIDIOL+CALCIFEROL SERPL-MC: 51 UG/L (ref 20–75)
TSH SERPL DL<=0.005 MIU/L-ACNC: 3.19 MU/L (ref 0.4–4)

## 2022-10-21 PROCEDURE — 84443 ASSAY THYROID STIM HORMONE: CPT | Mod: ORL | Performed by: NURSE PRACTITIONER

## 2022-10-21 PROCEDURE — 82306 VITAMIN D 25 HYDROXY: CPT | Mod: ORL | Performed by: NURSE PRACTITIONER

## 2022-10-21 PROCEDURE — 36415 COLL VENOUS BLD VENIPUNCTURE: CPT | Mod: ORL | Performed by: NURSE PRACTITIONER

## 2022-12-28 ENCOUNTER — LAB REQUISITION (OUTPATIENT)
Dept: LAB | Facility: CLINIC | Age: 82
End: 2022-12-28
Payer: COMMERCIAL

## 2022-12-28 DIAGNOSIS — Z29.9 ENCOUNTER FOR PROPHYLACTIC MEASURES, UNSPECIFIED: ICD-10-CM

## 2022-12-28 PROCEDURE — 87637 SARSCOV2&INF A&B&RSV AMP PRB: CPT | Mod: ORL | Performed by: NURSE PRACTITIONER

## 2022-12-29 LAB
FLUAV RNA SPEC QL NAA+PROBE: NEGATIVE
FLUBV RNA RESP QL NAA+PROBE: NEGATIVE
RSV RNA SPEC NAA+PROBE: NEGATIVE
SARS-COV-2 RNA RESP QL NAA+PROBE: NEGATIVE

## 2023-07-19 ENCOUNTER — NURSING HOME VISIT (OUTPATIENT)
Dept: GERIATRICS | Facility: CLINIC | Age: 83
End: 2023-07-19
Payer: COMMERCIAL

## 2023-07-19 VITALS
RESPIRATION RATE: 14 BRPM | HEART RATE: 83 BPM | DIASTOLIC BLOOD PRESSURE: 74 MMHG | WEIGHT: 186.6 LBS | SYSTOLIC BLOOD PRESSURE: 106 MMHG | BODY MASS INDEX: 31.09 KG/M2 | HEIGHT: 65 IN | OXYGEN SATURATION: 96 % | TEMPERATURE: 98.6 F

## 2023-07-19 DIAGNOSIS — E55.9 VITAMIN D DEFICIENCY: ICD-10-CM

## 2023-07-19 DIAGNOSIS — I10 HTN (HYPERTENSION), BENIGN: ICD-10-CM

## 2023-07-19 DIAGNOSIS — F01.50 VASCULAR DEMENTIA WITHOUT BEHAVIORAL DISTURBANCE (H): ICD-10-CM

## 2023-07-19 DIAGNOSIS — Z86.73 HISTORY OF CVA (CEREBROVASCULAR ACCIDENT): Primary | ICD-10-CM

## 2023-07-19 DIAGNOSIS — F10.21 ALCOHOL DEPENDENCE IN REMISSION (H): ICD-10-CM

## 2023-07-19 DIAGNOSIS — S72.001D CLOSED FRACTURE OF RIGHT HIP REQUIRING OPERATIVE REPAIR WITH ROUTINE HEALING, SUBSEQUENT ENCOUNTER: ICD-10-CM

## 2023-07-19 DIAGNOSIS — R13.10 DYSPHAGIA, UNSPECIFIED TYPE: ICD-10-CM

## 2023-07-19 DIAGNOSIS — K59.00 CONSTIPATION, UNSPECIFIED CONSTIPATION TYPE: ICD-10-CM

## 2023-07-19 PROBLEM — N40.0 BENIGN PROSTATIC HYPERPLASIA WITHOUT LOWER URINARY TRACT SYMPTOMS: Status: ACTIVE | Noted: 2023-07-19

## 2023-07-19 PROBLEM — L90.5 FACIAL SCAR: Status: ACTIVE | Noted: 2023-07-19

## 2023-07-19 PROBLEM — Z87.81: Status: ACTIVE | Noted: 2023-07-19

## 2023-07-19 PROBLEM — M54.50 CHRONIC LOW BACK PAIN: Status: ACTIVE | Noted: 2023-07-19

## 2023-07-19 PROBLEM — F43.10 PTSD (POST-TRAUMATIC STRESS DISORDER): Status: ACTIVE | Noted: 2023-07-19

## 2023-07-19 PROBLEM — I63.9 CVA (CEREBRAL VASCULAR ACCIDENT) (H): Status: ACTIVE | Noted: 2023-07-19

## 2023-07-19 PROBLEM — S72.001A: Status: ACTIVE | Noted: 2023-06-24

## 2023-07-19 PROBLEM — G89.29 CHRONIC LOW BACK PAIN: Status: ACTIVE | Noted: 2023-07-19

## 2023-07-19 PROBLEM — F14.11 COCAINE ABUSE IN REMISSION (H): Status: ACTIVE | Noted: 2023-07-19

## 2023-07-19 PROBLEM — Z87.891 HISTORY OF TOBACCO USE: Status: ACTIVE | Noted: 2023-07-19

## 2023-07-19 PROCEDURE — 99310 SBSQ NF CARE HIGH MDM 45: CPT | Performed by: NURSE PRACTITIONER

## 2023-07-19 RX ORDER — ACETAMINOPHEN 500 MG
1000 TABLET ORAL 2 TIMES DAILY
COMMUNITY

## 2023-07-19 RX ORDER — BISACODYL 10 MG
10 SUPPOSITORY, RECTAL RECTAL DAILY PRN
COMMUNITY

## 2023-07-19 RX ORDER — AMOXICILLIN 250 MG
1 CAPSULE ORAL 2 TIMES DAILY PRN
COMMUNITY

## 2023-07-19 RX ORDER — ASPIRIN 81 MG/1
162 TABLET, CHEWABLE ORAL 2 TIMES DAILY
COMMUNITY
End: 2023-08-08

## 2023-07-19 RX ORDER — AMOXICILLIN 250 MG
1 CAPSULE ORAL 2 TIMES DAILY
COMMUNITY

## 2023-07-19 NOTE — PROGRESS NOTES
[facility] GERIATRICS  Chief Complaint   Patient presents with    [facility] Medical Record Number:  [id number]  Place of Service where encounter took place:  [facility] () [[id number]]    HPI:    Yue Wang  is 82 year old (1940), who is being seen today for a federally mandated E/M visit.     His past medical history includes  Advanced vascular dementia  Dysphagia   Depression   right femoral neck fracture s/p ORIF  Constipation    Today patient was seen in his wheelchair.   Due to cognitive impairment review of systems not able to be conducted.   Nursing has no concerns. BIMS= 8/15 (score 8 to 12 suggests moderately impaired ) PHQ9= 7/27.   Patient needs extensive assistance with ADLs, uses a wheelchair.    His appetite is fair and consumes a Puree  diet with honey thick liquids.  Code status is DNR/DNI.     In reviewing point click care, vital signs stable.  At times blood pressure Is soft.    ALLERGIES:Chlorpromazine; Fluphenazine; and Tuberculin, ppd  PAST MEDICAL HISTORY:   Past Medical History:   Diagnosis Date    Constipation     Dementia (H)     Depression     Dysphagia     Hip fracture (H) 06/25/2023    right     PAST SURGICAL HISTORY:   has a past surgical history that includes Hip Fracture Surgery (Right, 06/25/2023).  FAMILY HISTORY: family history is not on file.  SOCIAL HISTORY:  reports that he has quit smoking. His smoking use included cigarettes. He has quit using smokeless tobacco. He reports that he does not currently use drugs after having used the following drugs: Cocaine.    MEDICATIONS:  MED REC REQUIRED  Post Medication Reconciliation Status:  Discharge medications reconciled, continue medications without change           Review of your medicines            Accurate as of July 19, 2023 11:59 PM. If you have any questions, ask your nurse or doctor.                CONTINUE these medicines which have NOT CHANGED        Dose / Directions   acetaminophen  "500 MG tablet  Commonly known as: TYLENOL      Dose: 1,000 mg  Take 1,000 mg by mouth 3 times daily  Refills: 0     Aspirin 81 81 MG chewable tablet  Generic drug: aspirin      Dose: 162 mg  Take 162 mg by mouth 2 times daily  Refills: 0     bisacodyl 10 MG suppository  Commonly known as: DULCOLAX      Dose: 10 mg  Place 10 mg rectally daily as needed for constipation  Refills: 0     cholecalciferol 50 MCG (2000 UT) tablet      Dose: 1,000 Units  Take 1,000 Units by mouth daily  Refills: 0     melatonin 3 MG Caps      Dose: 3 mg  Take 3 mg by mouth daily as needed  Refills: 0     NUTRITIONAL SUPPLEMENT PO      Take by mouth 4 times daily  Refills: 0     * senna-docusate 8.6-50 MG tablet  Commonly known as: SENOKOT-S/PERICOLACE      Dose: 1 tablet  Take 1 tablet by mouth daily as needed for constipation  Refills: 0     * senna-docusate 8.6-50 MG tablet  Commonly known as: SENOKOT-S/PERICOLACE      Dose: 1 tablet  Take 1 tablet by mouth daily  Refills: 0     sertraline 50 MG tablet  Commonly known as: ZOLOFT      Dose: 50 mg  Take 50 mg by mouth daily  Refills: 0           * This list has 2 medication(s) that are the same as other medications prescribed for you. Read the directions carefully, and ask your doctor or other care provider to review them with you.                    ROS:  Unobtainable secondary to cognitive impairment.     Vitals:  /74   Pulse 83   Temp 98.6  F (37  C)   Resp 14   Ht 1.651 m (5' 5\")   Wt 84.6 kg (186 lb 9.6 oz)   SpO2 96%   BMI 31.05 kg/m    Body mass index is 31.05 kg/m .  Exam:  GENERAL APPEARANCE:  Alert, in no distress  RESP:  lungs clear to auscultation   CV:  rate-normal    Lab/Diagnostic data:       ASSESSMENT/PLAN  (Z86.73) History of CVA (cerebrovascular accident)  (primary encounter diagnosis)  (R13.10) Dysphagia, unspecified type  Comment: chronic.  In a wheelchair prior to hip fx and honey thickened liquids.  Plan: Continue with plan of care no changes at this " time, adjustment as needed      (F01.50) Vascular dementia without behavioral disturbance (H)  Comment: Chronic, progressive.   Patient's last BIMS was 8/15 which indications moderate impairment, Patient requiring 24-hour skilled nursing care. No behavioral issues or emotional distress.  Expect further functional and cognitive decline.  Expect weight loss.     Plan: Continue with plan of care no changes at this time, adjustment as needed      (I10) HTN (hypertension), benign  Comment: chronic. Controlled.   Plan: Continue with plan of care no changes at this time, adjustment as needed     [S72.001D] Closed fracture of right hip requiring operative repair with routine healing, subsequent encounter  (E55.9) Vitamin D deficiency  Comment: improving.  Fx in 6/2023.  Saw ortho recently for follow up.    Plan: BMP, CBC, Vitamin D level  Stop  BID on 8/6/2023  Follow up with ortho in August 2023.     [F10.21]  Alcohol dependence in remission (H)   Comment: chronic stable.  Takes sertraline.   Plan: Continue with plan of care no changes at this time, adjustment as needed      (K59.00) Constipation, unspecified constipation type  Comment: Chronic.   Plan: Continue with plan of care no changes at this time, adjustment as needed      Electronically signed by:  JENY Clark CNP        45 minutes was spent reviewing medical record from [facility] hospital, assessing patient, reviewing medical notes from previous primary care provider,  coordinating above plan of care with nursing , SW, therapy and dietitian and patient and reviewed medications with patient and counseled pt. on above plan of care.

## 2023-07-19 NOTE — LETTER
7/19/2023        RE: Yue Wang  [address]  [address]  [place] [address]        [facility] GERIATRICS  Chief Complaint   Patient presents with     [facility] Medical Record Number:  [id number]  Place of Service where encounter took place:  [facility] () [[id number]]    HPI:    Yue Wang  is 82 year old (1940), who is being seen today for a federally mandated E/M visit.     His past medical history includes  Advanced vascular dementia  Dysphagia   Depression   right femoral neck fracture s/p ORIF  Constipation    Today patient was seen in his wheelchair.   Due to cognitive impairment review of systems not able to be conducted.   Nursing has no concerns. BIMS= 8/15 (score 8 to 12 suggests moderately impaired ) PHQ9= 7/27.   Patient needs extensive assistance with ADLs, uses a wheelchair.    His appetite is fair and consumes a Puree  diet with honey thick liquids.  Code status is DNR/DNI.     In reviewing point click care, vital signs stable.  At times blood pressure Is soft.    ALLERGIES:Chlorpromazine; Fluphenazine; and Tuberculin, ppd  PAST MEDICAL HISTORY:   Past Medical History:   Diagnosis Date     Constipation      Dementia (H)      Depression      Dysphagia      Hip fracture (H) 06/25/2023    right     PAST SURGICAL HISTORY:   has a past surgical history that includes Hip Fracture Surgery (Right, 06/25/2023).  FAMILY HISTORY: family history is not on file.  SOCIAL HISTORY:  reports that he has quit smoking. His smoking use included cigarettes. He has quit using smokeless tobacco. He reports that he does not currently use drugs after having used the following drugs: Cocaine.    MEDICATIONS:  MED REC REQUIRED  Post Medication Reconciliation Status:  Discharge medications reconciled, continue medications without change           Review of your medicines            Accurate as of July 19, 2023 11:59 PM. If you have any questions, ask your nurse or doctor.        "         CONTINUE these medicines which have NOT CHANGED        Dose / Directions   acetaminophen 500 MG tablet  Commonly known as: TYLENOL      Dose: 1,000 mg  Take 1,000 mg by mouth 3 times daily  Refills: 0     Aspirin 81 81 MG chewable tablet  Generic drug: aspirin      Dose: 162 mg  Take 162 mg by mouth 2 times daily  Refills: 0     bisacodyl 10 MG suppository  Commonly known as: DULCOLAX      Dose: 10 mg  Place 10 mg rectally daily as needed for constipation  Refills: 0     cholecalciferol 50 MCG (2000 UT) tablet      Dose: 1,000 Units  Take 1,000 Units by mouth daily  Refills: 0     melatonin 3 MG Caps      Dose: 3 mg  Take 3 mg by mouth daily as needed  Refills: 0     NUTRITIONAL SUPPLEMENT PO      Take by mouth 4 times daily  Refills: 0     * senna-docusate 8.6-50 MG tablet  Commonly known as: SENOKOT-S/PERICOLACE      Dose: 1 tablet  Take 1 tablet by mouth daily as needed for constipation  Refills: 0     * senna-docusate 8.6-50 MG tablet  Commonly known as: SENOKOT-S/PERICOLACE      Dose: 1 tablet  Take 1 tablet by mouth daily  Refills: 0     sertraline 50 MG tablet  Commonly known as: ZOLOFT      Dose: 50 mg  Take 50 mg by mouth daily  Refills: 0           * This list has 2 medication(s) that are the same as other medications prescribed for you. Read the directions carefully, and ask your doctor or other care provider to review them with you.                    ROS:  Unobtainable secondary to cognitive impairment.     Vitals:  /74   Pulse 83   Temp 98.6  F (37  C)   Resp 14   Ht 1.651 m (5' 5\")   Wt 84.6 kg (186 lb 9.6 oz)   SpO2 96%   BMI 31.05 kg/m    Body mass index is 31.05 kg/m .  Exam:  GENERAL APPEARANCE:  Alert, in no distress  RESP:  lungs clear to auscultation   CV:  rate-normal    Lab/Diagnostic data:       ASSESSMENT/PLAN  (Z86.73) History of CVA (cerebrovascular accident)  (primary encounter diagnosis)  (R13.10) Dysphagia, unspecified type  Comment: chronic.  In a wheelchair " prior to hip fx and honey thickened liquids.  Plan: Continue with plan of care no changes at this time, adjustment as needed      (F01.50) Vascular dementia without behavioral disturbance (H)  Comment: Chronic, progressive.   Patient's last BIMS was 8/15 which indications moderate impairment, Patient requiring 24-hour skilled nursing care. No behavioral issues or emotional distress.  Expect further functional and cognitive decline.  Expect weight loss.     Plan: Continue with plan of care no changes at this time, adjustment as needed      (I10) HTN (hypertension), benign  Comment: chronic. Controlled.   Plan: Continue with plan of care no changes at this time, adjustment as needed     [S72.001D] Closed fracture of right hip requiring operative repair with routine healing, subsequent encounter  (E55.9) Vitamin D deficiency  Comment: improving.  Fx in 6/2023.  Saw ortho recently for follow up.    Plan: BMP, CBC, Vitamin D level  Stop  BID on 8/6/2023  Follow up with ortho in August 2023.     [F10.21]  Alcohol dependence in remission (H)   Comment: chronic stable.  Takes sertraline.   Plan: Continue with plan of care no changes at this time, adjustment as needed      (K59.00) Constipation, unspecified constipation type  Comment: Chronic.   Plan: Continue with plan of care no changes at this time, adjustment as needed      Electronically signed by:  JENY Clark CNP        45 minutes was spent reviewing medical record from [facility] hospital, assessing patient, reviewing medical notes from previous primary care provider,  coordinating above plan of care with nursing , SW, therapy and dietitian and patient and reviewed medications with patient and counseled pt. on above plan of care.             Sincerely,        JENY Clark CNP

## 2023-08-04 ENCOUNTER — NURSING HOME VISIT (OUTPATIENT)
Dept: GERIATRICS | Facility: CLINIC | Age: 83
End: 2023-08-04
Payer: COMMERCIAL

## 2023-08-04 VITALS
BODY MASS INDEX: 19.09 KG/M2 | SYSTOLIC BLOOD PRESSURE: 116 MMHG | WEIGHT: 114.6 LBS | HEIGHT: 65 IN | HEART RATE: 63 BPM | TEMPERATURE: 97.6 F | DIASTOLIC BLOOD PRESSURE: 73 MMHG | RESPIRATION RATE: 16 BRPM | OXYGEN SATURATION: 95 %

## 2023-08-04 DIAGNOSIS — M81.0 OSTEOPOROSIS, UNSPECIFIED OSTEOPOROSIS TYPE, UNSPECIFIED PATHOLOGICAL FRACTURE PRESENCE: ICD-10-CM

## 2023-08-04 DIAGNOSIS — S72.001D CLOSED FRACTURE OF RIGHT HIP WITH ROUTINE HEALING, SUBSEQUENT ENCOUNTER: ICD-10-CM

## 2023-08-04 DIAGNOSIS — R13.10 DYSPHAGIA, UNSPECIFIED TYPE: ICD-10-CM

## 2023-08-04 DIAGNOSIS — D62 ABLA (ACUTE BLOOD LOSS ANEMIA): ICD-10-CM

## 2023-08-04 DIAGNOSIS — Z86.73 HISTORY OF CVA (CEREBROVASCULAR ACCIDENT): ICD-10-CM

## 2023-08-04 DIAGNOSIS — F01.50 VASCULAR DEMENTIA WITHOUT BEHAVIORAL DISTURBANCE (H): Primary | ICD-10-CM

## 2023-08-04 PROCEDURE — 99305 1ST NF CARE MODERATE MDM 35: CPT | Performed by: INTERNAL MEDICINE

## 2023-08-04 RX ORDER — ASPIRIN 81 MG/1
81 TABLET ORAL DAILY
COMMUNITY

## 2023-08-04 NOTE — Clinical Note
AXELI I ordered a Hgb and Vitamin D level for next Wed 8/9/23. Can you also have on your radar that later this month ortho wants a follow up xray that could just be shared with [facility] rather than having to have him go in for an appointment please? OR maybe just have Roxann Charles NP follow this if you'd like? Thanks!

## 2023-08-04 NOTE — PROGRESS NOTES
[facility]  PHYSICIAN NOTE    Chief Complaint   Patient presents with    [facility] Regulatory       HPI:    Yue Wang is a 82 year old  (1940), who is being seen today for a federally mandated E/M visit at [facility]. [place] team just assumed care of Yue from previous [facility] provider group in anticipation of the previous building closure and relocation to [place] site which occurred earlier this week. He resides on memory care unit; recent BIMS 8/15. He has h/o dementia with CVA, HTN, past alcohol use, as well as R hip fracture in June 2023 s/p ORIF. He is just completing the 6 week course of  mg BID for DVT prophylaxis. Due to dysphagia, he is on a pureed diet with honey thickened liquids and needs help with feeding from staff. He did have initial ortho follow up as reviewed per [place] on 7/12/23 and progressing well and able to WBAT. To follow up with additional repeat xrays later this month.    He is seen in his room with assist of 2 to get up for lunch. Soft spoken and frail. Admits he doesn't understand much about the move and is a bit nervous about this but isn't looking physically distressed and not able to expand upon this. Doesn't volunteer any information on his own but admits willing to go down to lunchroom.    Past Medical History:   Diagnosis Date    Constipation     Dementia (H)     Depression     Dysphagia     Hip fracture (H) 06/25/2023    right        CODE STATUS: DNR/I    ALLERGIES: Chlorpromazine; Fluphenazine; and Tuberculin, ppd    MEDICATIONS: Reviewed and updated in [facility] according to facility MAR  Current Outpatient Medications   Medication Sig Dispense Refill    acetaminophen (TYLENOL) 500 MG tablet Take 1,000 mg by mouth 3 times daily      aspirin (ASPIRIN 81) 81 MG chewable tablet Take 162 mg by mouth 2 times daily      bisacodyl (DULCOLAX) 10 MG suppository Place 10 mg rectally daily as needed  "for constipation      melatonin 3 MG CAPS Take 3 mg by mouth daily as needed      Nutritional Supplements (NUTRITIONAL SUPPLEMENT PO) Take by mouth 4 times daily      senna-docusate (SENOKOT-S/PERICOLACE) 8.6-50 MG tablet Take 1 tablet by mouth daily as needed for constipation      senna-docusate (SENOKOT-S/PERICOLACE) 8.6-50 MG tablet Take 1 tablet by mouth daily      sertraline (ZOLOFT) 50 MG tablet Take 50 mg by mouth daily         ROS:  Limited secondary to cognitive impairment but today pt reports as above in HPI    Exam:  /73   Pulse 63   Temp 97.6  F (36.4  C)   Resp 16   Ht 1.651 m (5' 5\")   Wt 52 kg (114 lb 9.6 oz)   SpO2 95%   BMI 19.07 kg/m    Alert, pleasant, nicely groomed  Moist oral mucosa  Slight right facial droop  Heart tones regular  Breathing non-labored  Abdomen soft  No edema  Stiff overall all extremities without tremor, assist of 2 with lap belt to transfer from bed to chair, somewhat forward flexed posture  Soft spoken  Does not appear in distress    Lab/Diagnostic Data:    Per [facility] labs: June 2023 Hgb 8.6, Cr 0.78    Jan 2015 MRI:  Impression:    1. No definite acute infarction.   2. Advanced periventricular leukoencephalopathy and corpus callosal insults/atrophy, of indeterminate cause but presumably due to advanced vasculopathy/small vessel ischemic disease. Small foci of suspected chronic hemosiderin deposition in the basal ganglia bilaterally and left parietal subcortical white matter are also noted, which may fit with hypertensive vasculopathy, perhaps vasculitis or other vasculopathy.   3. Moderately advanced cerebral atrophy.       ASSESSMENT/PLAN:  Vascular dementia without behavioral disturbance (H)  History of CVA (cerebrovascular accident)  Resides on secure memory care unit without behaviors  Has been on low dose Sertraline 50 mg daily for h/o depression with past alcohol misuse  Continue Sertraline for now especially as settles into new " facility but always monitor for on-going adjustments/GDR if appropriate  Does have some parkinsonian features on exam that we'll monitor as we get to know him  Also mild R facial droop which correlates with old MRI in 2015 as noted above    Dysphagia, unspecified type  Continues on regular pureed diet with honey thickened liquids; I saw later today that staff do need to help him eat by getting the food to his mouth  Weight has been stable in the 110s for the past year but previously was 120-130 range for several years; likely loss d/t comorbidities including dementia where weight loss is expected  Also as above, monitor parkinsonian symptoms    Closed fracture of right hip with routine healing, subsequent encounter  Osteoporosis, unspecified osteoporosis type, unspecified pathological fracture presence  ABLA (acute blood loss anemia)  S/p ORIF June 2023  Xrays later this month to be sent to [facility] ortho; will settle in first and can order at a later time  ? Physical therapy done early on - all will get a baseline assessment as settles into new LTC  Quite frail and parkinsonian in appearance  His hospital discharge Hgb was down to 8.6 as of June 2023; future recheck along with Vitamin D lab ordered for Wed 8/9/23  Will start for sure Vitamin D 2000 international unit(s) daily; adjust if needed  Also next week will be done with  mg BID for DVT prophy; will change to ASA 81 mg daily given h/o CVA        Electronically signed by:  Roxann Vann DO

## 2023-08-04 NOTE — LETTER
8/4/2023        RE: Yue Wang  [address]  [address]  [place] [address]        [place] GERIATRIC SERVICES  PHYSICIAN NOTE    Chief Complaint   Patient presents with     [facility] Regulatory       HPI:    Yue Wang is a 82 year old  (1940), who is being seen today for a federally mandated E/M visit at [facility]. [place] team just assumed care of Yue from previous [facility] provider group in anticipation of the previous building closure and relocation to [place] site which occurred earlier this week. He resides on memory care unit; recent BIMS 8/15. He has h/o dementia with CVA, HTN, past alcohol use, as well as R hip fracture in June 2023 s/p ORIF. He is just completing the 6 week course of  mg BID for DVT prophylaxis. Due to dysphagia, he is on a pureed diet with honey thickened liquids and needs help with feeding from staff. He did have initial ortho follow up as reviewed per [place] on 7/12/23 and progressing well and able to WBAT. To follow up with additional repeat xrays later this month.    He is seen in his room with assist of 2 to get up for lunch. Soft spoken and frail. Admits he doesn't understand much about the move and is a bit nervous about this but isn't looking physically distressed and not able to expand upon this. Doesn't volunteer any information on his own but admits willing to go down to lunchroom.    Past Medical History:   Diagnosis Date     Constipation      Dementia (H)      Depression      Dysphagia      Hip fracture (H) 06/25/2023    right        CODE STATUS: DNR/I    ALLERGIES: Chlorpromazine; Fluphenazine; and Tuberculin, ppd    MEDICATIONS: Reviewed and updated in [facility] according to facility MAR  Current Outpatient Medications   Medication Sig Dispense Refill     acetaminophen (TYLENOL) 500 MG tablet Take 1,000 mg by mouth 3 times daily       aspirin (ASPIRIN 81) 81 MG chewable tablet Take 162 mg by  "mouth 2 times daily       bisacodyl (DULCOLAX) 10 MG suppository Place 10 mg rectally daily as needed for constipation       melatonin 3 MG CAPS Take 3 mg by mouth daily as needed       Nutritional Supplements (NUTRITIONAL SUPPLEMENT PO) Take by mouth 4 times daily       senna-docusate (SENOKOT-S/PERICOLACE) 8.6-50 MG tablet Take 1 tablet by mouth daily as needed for constipation       senna-docusate (SENOKOT-S/PERICOLACE) 8.6-50 MG tablet Take 1 tablet by mouth daily       sertraline (ZOLOFT) 50 MG tablet Take 50 mg by mouth daily         ROS:  Limited secondary to cognitive impairment but today pt reports as above in HPI    Exam:  /73   Pulse 63   Temp 97.6  F (36.4  C)   Resp 16   Ht 1.651 m (5' 5\")   Wt 52 kg (114 lb 9.6 oz)   SpO2 95%   BMI 19.07 kg/m    Alert, pleasant, nicely groomed  Moist oral mucosa  Slight right facial droop  Heart tones regular  Breathing non-labored  Abdomen soft  No edema  Stiff overall all extremities without tremor, assist of 2 with lap belt to transfer from bed to chair, somewhat forward flexed posture  Soft spoken  Does not appear in distress    Lab/Diagnostic Data:    Per [facility] hospital labs: June 2023 Hgb 8.6, Cr 0.78    Jan 2015 MRI:  Impression:    1. No definite acute infarction.   2. Advanced periventricular leukoencephalopathy and corpus callosal insults/atrophy, of indeterminate cause but presumably due to advanced vasculopathy/small vessel ischemic disease. Small foci of suspected chronic hemosiderin deposition in the basal ganglia bilaterally and left parietal subcortical white matter are also noted, which may fit with hypertensive vasculopathy, perhaps vasculitis or other vasculopathy.   3. Moderately advanced cerebral atrophy.       ASSESSMENT/PLAN:  Vascular dementia without behavioral disturbance (H)  History of CVA (cerebrovascular accident)  Resides on secure memory care unit without behaviors  Has been on low dose Sertraline 50 mg daily " for h/o depression with past alcohol misuse  Continue Sertraline for now especially as settles into new facility but always monitor for on-going adjustments/GDR if appropriate  Does have some parkinsonian features on exam that we'll monitor as we get to know him  Also mild R facial droop which correlates with old MRI in 2015 as noted above    Dysphagia, unspecified type  Continues on regular pureed diet with honey thickened liquids; I saw later today that staff do need to help him eat by getting the food to his mouth  Weight has been stable in the 110s for the past year but previously was 120-130 range for several years; likely loss d/t comorbidities including dementia where weight loss is expected  Also as above, monitor parkinsonian symptoms    Closed fracture of right hip with routine healing, subsequent encounter  Osteoporosis, unspecified osteoporosis type, unspecified pathological fracture presence  ABLA (acute blood loss anemia)  S/p ORIF June 2023  Xrays later this month to be sent to [facility] ortho; will settle in first and can order at a later time  ? Physical therapy done early on - all will get a baseline assessment as settles into new LTC  Quite frail and parkinsonian in appearance  His hospital discharge Hgb was down to 8.6 as of June 2023; future recheck along with Vitamin D lab ordered for Wed 8/9/23  Will start for sure Vitamin D 2000 international unit(s) daily; adjust if needed  Also next week will be done with  mg BID for DVT prophy; will change to ASA 81 mg daily given h/o CVA        Electronically signed by:  Roxann Vann DO      Sincerely,        Roxann Vann DO

## 2023-08-08 ENCOUNTER — LAB REQUISITION (OUTPATIENT)
Dept: LAB | Facility: CLINIC | Age: 83
End: 2023-08-08
Payer: COMMERCIAL

## 2023-08-08 DIAGNOSIS — Z87.81 PERSONAL HISTORY OF (HEALED) TRAUMATIC FRACTURE: ICD-10-CM

## 2023-08-09 LAB — HGB BLD-MCNC: 10.1 G/DL (ref 13.3–17.7)

## 2023-08-09 PROCEDURE — 36415 COLL VENOUS BLD VENIPUNCTURE: CPT | Mod: ORL | Performed by: INTERNAL MEDICINE

## 2023-08-09 PROCEDURE — 85018 HEMOGLOBIN: CPT | Mod: ORL | Performed by: INTERNAL MEDICINE

## 2023-08-09 PROCEDURE — 82306 VITAMIN D 25 HYDROXY: CPT | Mod: ORL | Performed by: INTERNAL MEDICINE

## 2023-08-10 LAB — DEPRECATED CALCIDIOL+CALCIFEROL SERPL-MC: 57 UG/L (ref 20–75)

## 2023-08-14 ENCOUNTER — PATIENT OUTREACH (OUTPATIENT)
Dept: GERIATRIC MEDICINE | Facility: CLINIC | Age: 83
End: 2023-08-14
Payer: COMMERCIAL

## 2023-08-14 NOTE — PROGRESS NOTES
[facility] Care Coordination Contact    Member became effective with  [facility] on 8/1/2023 with [facility].  Previous Health Plan: [employer]  Previous Care System: [employer]  Previous care coordinators name and number: Diann Shen [phone]  Waiver Type: N/A  No MMIS entries since 2021  UTF received: Yes: Received and saved to member file  Address/Phone discrepancy: NA    WL sent to sister Matthewaldaleksandra MERINO.      Tiera Hunt  Care Management Specialist   [facility]   [phone]

## 2023-08-14 NOTE — LETTER
August 14, 2023    Important [employer] Information    YUE PAN HEARD  [address]    [facility] [address]    Your New Care Coordinator  Dear Yue,  My name is XANDER Prather, [facility]  and I am your new Care Coordinator. You may reach me by calling [phone]. I will be in touch with you shortly to address any questions you may have.   I have also been in contact with  Diann Shen RN , your previous care coordinator, to ensure a smooth transition.  Questions?  Call me at [phone] Monday-Friday between 8am and 5pm. TTY: [phone]. I look forward to working with you as a [employer]  member.  Sincerely,    XANDER Prather, [facility]    E-mail: [email or web address]  Phone: [phone]      [facility]    cc: member records

## 2023-08-28 ENCOUNTER — PATIENT OUTREACH (OUTPATIENT)
Dept: GERIATRIC MEDICINE | Facility: CLINIC | Age: 83
End: 2023-08-28
Payer: COMMERCIAL

## 2023-08-28 NOTE — PROGRESS NOTES
[facility] Care Coordination Contact    Scheduled with [facility] for annual visit on 8/30/23.    XANDER Prather, [facility]  [phone]

## 2023-08-30 ENCOUNTER — PATIENT OUTREACH (OUTPATIENT)
Dept: GERIATRIC MEDICINE | Facility: CLINIC | Age: 83
End: 2023-08-30
Payer: COMMERCIAL

## 2023-08-30 NOTE — Clinical Note
Hey there, I met him last week.  I couldn't understand him very well, but he was pleasant.  The staff said he gets confused easily, and sounds like  medically he has been fairly stable.  Please let me know if I can be any assistance, thank you!  XANDER Prather, [facility] [phone]

## 2023-09-08 ASSESSMENT — ACTIVITIES OF DAILY LIVING (ADL)
DEPENDENT_IADLS:: CLEANING;TRANSPORTATION;INCONTINENCE;COOKING;LAUNDRY;SHOPPING;MEAL PREPARATION;MEDICATION MANAGEMENT;MONEY MANAGEMENT

## 2023-10-04 ENCOUNTER — NURSING HOME VISIT (OUTPATIENT)
Dept: GERIATRICS | Facility: CLINIC | Age: 83
End: 2023-10-04
Payer: COMMERCIAL

## 2023-10-04 VITALS
WEIGHT: 112.2 LBS | HEART RATE: 68 BPM | TEMPERATURE: 98.2 F | OXYGEN SATURATION: 97 % | SYSTOLIC BLOOD PRESSURE: 107 MMHG | HEIGHT: 66 IN | RESPIRATION RATE: 16 BRPM | BODY MASS INDEX: 18.03 KG/M2 | DIASTOLIC BLOOD PRESSURE: 61 MMHG

## 2023-10-04 DIAGNOSIS — F10.21 ALCOHOL DEPENDENCE IN REMISSION (H): ICD-10-CM

## 2023-10-04 DIAGNOSIS — M81.0 OSTEOPOROSIS, UNSPECIFIED OSTEOPOROSIS TYPE, UNSPECIFIED PATHOLOGICAL FRACTURE PRESENCE: ICD-10-CM

## 2023-10-04 DIAGNOSIS — D62 ABLA (ACUTE BLOOD LOSS ANEMIA): ICD-10-CM

## 2023-10-04 DIAGNOSIS — F43.10 PTSD (POST-TRAUMATIC STRESS DISORDER): ICD-10-CM

## 2023-10-04 DIAGNOSIS — S72.001D CLOSED FRACTURE OF RIGHT HIP WITH ROUTINE HEALING, SUBSEQUENT ENCOUNTER: ICD-10-CM

## 2023-10-04 DIAGNOSIS — E55.9 VITAMIN D DEFICIENCY: ICD-10-CM

## 2023-10-04 DIAGNOSIS — M54.50 CHRONIC LOW BACK PAIN, UNSPECIFIED BACK PAIN LATERALITY, UNSPECIFIED WHETHER SCIATICA PRESENT: ICD-10-CM

## 2023-10-04 DIAGNOSIS — G89.29 CHRONIC LOW BACK PAIN, UNSPECIFIED BACK PAIN LATERALITY, UNSPECIFIED WHETHER SCIATICA PRESENT: ICD-10-CM

## 2023-10-04 DIAGNOSIS — I10 HTN (HYPERTENSION), BENIGN: ICD-10-CM

## 2023-10-04 DIAGNOSIS — R13.10 DYSPHAGIA, UNSPECIFIED TYPE: ICD-10-CM

## 2023-10-04 DIAGNOSIS — E78.5 HYPERLIPIDEMIA, UNSPECIFIED HYPERLIPIDEMIA TYPE: ICD-10-CM

## 2023-10-04 DIAGNOSIS — Z86.73 HISTORY OF CVA (CEREBROVASCULAR ACCIDENT): ICD-10-CM

## 2023-10-04 DIAGNOSIS — K59.00 CONSTIPATION, UNSPECIFIED CONSTIPATION TYPE: ICD-10-CM

## 2023-10-04 DIAGNOSIS — F01.50 VASCULAR DEMENTIA WITHOUT BEHAVIORAL DISTURBANCE (H): Primary | ICD-10-CM

## 2023-10-04 PROCEDURE — 99309 SBSQ NF CARE MODERATE MDM 30: CPT | Performed by: NURSE PRACTITIONER

## 2023-10-04 NOTE — LETTER
10/4/2023        RE: Yue Wang  C/o Amanda Wang  [address]   [facility] [address]        [facility] GERIATRICS  Chief Complaint   Patient presents with     detention Regulatory     [place] Medical Record Number:  [id number]  Place of Service where encounter took place:  [facility] ([facility]) [[id number]]    HPI:    Yue Wang  is 82 year old (1940), who is being seen today for a federally mandated E/M visit. Today's concerns are:     Vascular dementia without behavioral disturbance (H)  PTSD (post-traumatic stress disorder)  History of CVA (cerebrovascular accident)  Dysphagia, unspecified type  HTN (hypertension), benign  Hyperlipidemia, unspecified hyperlipidemia type  Closed fracture of right hip with routine healing, subsequent encounter  Osteoporosis, unspecified osteoporosis type, unspecified pathological fracture presence  Chronic low back pain, unspecified back pain laterality, unspecified whether sciatica present  Vitamin D deficiency  Constipation, unspecified constipation type  ABLA (acute blood loss anemia)  Alcohol dependence in remission (H)    Met with patient who was found upright in wheelchair in day center about to participate in activity. Minimal conversation reported due to dementia risks. Per therapy they are stopping as of 10/9/23 due to poor progression. He does ambulate very short distance with therapy only, difficult to start ambulation program due to poor ambulation safety by self and staff. He remains wheelchair bound. He denies any chest pain, palpitations, shortness of breath, FLORES, lightheadedness, dizziness, or cough. No respiratory distress. Denies any abdominal discomfort. Denies N&V. Denies B&B concerns. Denies dysuria or frequency. Denies loose or constipation.  Incontinent of B&B. Appetite good, but requires feeding assistance. Sleeping well. No reports of pain complaints.     BP Readings from Last 3 Encounters:    10/04/23 107/61   08/04/23 116/73   07/19/23 106/74     Wt Readings from Last 5 Encounters:   10/04/23 50.9 kg (112 lb 3.2 oz)   08/04/23 52 kg (114 lb 9.6 oz)   07/19/23 84.6 kg (186 lb 9.6 oz)     ALLERGIES:Chlorpromazine; Fluphenazine; and Tuberculin, ppd  PAST MEDICAL HISTORY:   Past Medical History:   Diagnosis Date     Constipation      Dementia (H)      Depression      Dysphagia      Hip fracture (H) 06/25/2023    right     PAST SURGICAL HISTORY:   has a past surgical history that includes Hip Fracture Surgery (Right, 06/25/2023).  FAMILY HISTORY: family history is not on file.  SOCIAL HISTORY:  reports that he has quit smoking. His smoking use included cigarettes. He has quit using smokeless tobacco. He reports that he does not currently use drugs after having used the following drugs: Cocaine.    MEDICATIONS:  MED REC REQUIRED  Post Medication Reconciliation Status:  Patient was not discharged from an inpatient facility or TCU         Review of your medicines            Accurate as of October 4, 2023  3:09 PM. If you have any questions, ask your nurse or doctor.                CONTINUE these medicines which have NOT CHANGED        Dose / Directions   acetaminophen 500 MG tablet  Commonly known as: TYLENOL      Dose: 1,000 mg  Take 1,000 mg by mouth 3 times daily  Refills: 0     aspirin 81 MG EC tablet      Dose: 81 mg  Take 81 mg by mouth daily  Refills: 0     bisacodyl 10 MG suppository  Commonly known as: DULCOLAX      Dose: 10 mg  Place 10 mg rectally daily as needed for constipation  Refills: 0     cholecalciferol 50 MCG (2000 UT) tablet      Dose: 1 tablet  Take 1 tablet by mouth daily  Refills: 0     melatonin 3 MG Caps      Dose: 3 mg  Take 3 mg by mouth nightly as needed  Refills: 0     * senna-docusate 8.6-50 MG tablet  Commonly known as: SENOKOT-S/PERICOLACE      Dose: 1 tablet  Take 1 tablet by mouth daily as needed for constipation  Refills: 0     * senna-docusate 8.6-50 MG tablet  Commonly  "known as: SENOKOT-S/PERICOLACE      Dose: 1 tablet  Take 1 tablet by mouth daily  Refills: 0     sertraline 50 MG tablet  Commonly known as: ZOLOFT      Dose: 50 mg  Take 50 mg by mouth daily  Refills: 0           * This list has 2 medication(s) that are the same as other medications prescribed for you. Read the directions carefully, and ask your doctor or other care provider to review them with you.                 Case Management:  I have reviewed the care plan and MDS and do agree with the plan. Patient's desire to return to the community is not assessible due to cognitive impairment. Information reviewed:  Medications, vital signs, orders, and nursing notes.    ROS:  Unobtainable secondary to cognitive impairment.     Vitals:  /61   Pulse 68   Temp 98.2  F (36.8  C)   Resp 16   Ht 1.676 m (5' 6\")   Wt 50.9 kg (112 lb 3.2 oz)   SpO2 97%   BMI 18.11 kg/m    Body mass index is 18.11 kg/m .  Exam:  GENERAL APPEARANCE:  Alert, in no distress, cooperative  ENT:  Mouth and posterior oropharynx normal, moist mucous membranes, [place]  EYES:  EOM, conjunctivae, lids, pupils and irises normal  NECK:  No adenopathy,masses or thyromegaly  RESP:  respiratory effort and palpation of chest normal, lungs clear to auscultation , no respiratory distress  CV:  Palpation and auscultation of heart done , regular rate and rhythm, no murmur, rub, or gallop, peripheral edema 1+ in BLE  ABDOMEN:  normal bowel sounds, soft, nontender, no hepatosplenomegaly or other masses, no guarding or rebound  M/S:   Wheelchair bound. Requires A2 for all transfers cares and mobility  SKIN:  Inspection of skin and subcutaneous tissue baseline, Palpation of skin and subcutaneous tissue baseline  NEURO:   Cranial nerves 2-12 are normal tested and grossly at patient's baseline, no purposeful movement in upper and lower extremities  PSYCH:  oriented to self, insight and judgement impaired, memory impaired , affect and mood normal    Lab/Diagnostic " data:   Most Recent 3 CBC's:  Recent Labs   Lab Test 08/09/23  0827 08/04/22  1135 04/05/22  1127 03/22/22  0600   WBC  --  5.3 7.8 10.5   HGB 10.1* 11.7* 11.1* 9.7*   MCV  --  100 102* 105*   PLT  --  183 224 229     Most Recent 3 BMP's:  Recent Labs   Lab Test 09/15/22  0713 08/04/22  1135 04/05/22  1127    138 137   POTASSIUM 3.8 3.5 3.5   CHLORIDE 108 102 107   CO2 26 26 25   BUN 13 11.4 13   CR 0.61* 0.72 0.78   ANIONGAP 6 10 5   SUNIL 8.8 9.2 8.9   GLC 70 79 99     Most Recent TSH and T4:  Recent Labs   Lab Test 10/21/22  0750   TSH 3.19     Most Recent Anemia Panel:  Recent Labs   Lab Test 08/09/23  0827 08/04/22  1135   WBC  --  5.3   HGB 10.1* 11.7*   HCT  --  35.9*   MCV  --  100   PLT  --  183     Vitamin D Deficiency Screening Results:  Lab Results   Component Value Date    VITDT 57 08/09/2023    VITDT 51 10/21/2022    VITDT 73 07/21/2021       ASSESSMENT/PLAN  (F01.50) Vascular dementia without behavioral disturbance (H)  (primary encounter diagnosis)  (F43.10) PTSD (post-traumatic stress disorder)  Comment: Chronic. Progressive. Recent BIMS 7/15 with PHQ9 score 2. Resides in memory care  Plan:   -Monitor for worsening s/symptoms of concerns  -Weight loss to be expected. Most recent weight 112.2# and have been stable for the past 6 months.   -Monitor mood and behaviors  -Monitor for worsening mobility, eating and sleeping patterns  -Continue melatonin 3mg PRN  -Continue sertraline 50mg daily--GDR in future if able.   -CMP, CBC, vitamin D, vitamin B12, and lipid profile due 10/6/23    (Z86.73) History of CVA (cerebrovascular accident)  (R13.10) Dysphagia, unspecified type  (I10) HTN (hypertension), benign  (E78.5) Hyperlipidemia, unspecified hyperlipidemia type  Comment: Chronic. Based on JNC-8 goals,  patients age of 82 year old, no presence of diabetes or CKD, and goals of care goal BP is <150/90 mm Hg. Patient is stable and continue without pharmacological invention with routine assessment..SBP  trends around 130s. Also mild R facial droop which correlates with old MRI in 2015   Plan:   -Monitor for worsening s/symptoms of concerns  -Continue daily asa 81mg daily as directed  -Monitor BP and HR as directed  -Continue dysphagia diet with puree and HTL as directed  -CMP, CBC, vitamin D, vitamin B12, and lipid profile due 10/6/23    (S72.001D) Closed fracture of right hip with routine healing, subsequent encounter  (M81.0) Osteoporosis, unspecified osteoporosis type, unspecified pathological fracture presence  (M54.50,  G89.29) Chronic low back pain, unspecified back pain laterality, unspecified whether sciatica present  (E55.9) Vitamin D deficiency  Comment: Chronic. Fracture noted in June 2023. Followed by ortho with last visit in July 2023-they advised to follow up with repeat x rays within 1 month which was not fully completed.   Plan  -Monitor pain complaints  -Continue Tylenol 1000mg TID  -May benefit from topical agent in future if pain worsens.   -Continue vitamin D 2000U daily  -Monitor fall risks  -Follow up with orthopedic as directed--see advise below  -Can WBAT to R LE with help of PT. No gait restrictions, but high falls risk.  -Obtain x rays 2-3 views of right hip and femur. If easier, could get films [facility] and have reviewed by [place]opaedic provider [place] [facility] (can call [phone] to coordinate)    -CMP, CBC, vitamin D, vitamin B12, and lipid profile due 10/6/23    (K59.00) Constipation, unspecified constipation type  Comment: Chronic. Stable.  Plan:   -Monitor BM patterns  -Bisacodyl supp PRN  -Continue senna S daily and PRN    (D62) ABLA (acute blood loss anemia)  Comment: Chronic. Baseline hgb~ 10  Plan:   -Monitor for bleeding risks  -Monitor for falls  -CMP, CBC, vitamin D, vitamin B12, and lipid profile due 10/6/23    (F10.21) Alcohol dependence in remission (H)  Comment: Chronic. Stable. Unknown last ETOH  Plan:   -Monitor for worsening s/symptoms of concerns  -Continue without ETOH  products  -CMP, CBC, vitamin D, vitamin B12, and lipid profile due 10/6/23      Electronically signed by:  Dr. Tanya Washington DNP, APRN, FNP-C, WCS-C, EDS-C              Sincerely,        Tanya Washington, JENY CNP

## 2023-10-04 NOTE — PROGRESS NOTES
[facility] GERIATRICS  Chief Complaint   Patient presents with    halfway Regulatory     [place] Medical Record Number:  [id number]  Place of Service where encounter took place:  [facility] ([facility]) [[id number]]    HPI:    Yue Wang  is 82 year old (1940), who is being seen today for a federally mandated E/M visit. Today's concerns are:     Vascular dementia without behavioral disturbance (H)  PTSD (post-traumatic stress disorder)  History of CVA (cerebrovascular accident)  Dysphagia, unspecified type  HTN (hypertension), benign  Hyperlipidemia, unspecified hyperlipidemia type  Closed fracture of right hip with routine healing, subsequent encounter  Osteoporosis, unspecified osteoporosis type, unspecified pathological fracture presence  Chronic low back pain, unspecified back pain laterality, unspecified whether sciatica present  Vitamin D deficiency  Constipation, unspecified constipation type  ABLA (acute blood loss anemia)  Alcohol dependence in remission (H)    Met with patient who was found upright in wheelchair in day center about to participate in activity. Minimal conversation reported due to dementia risks. Per therapy they are stopping as of 10/9/23 due to poor progression. He does ambulate very short distance with therapy only, difficult to start ambulation program due to poor ambulation safety by self and staff. He remains wheelchair bound. He denies any chest pain, palpitations, shortness of breath, FLORES, lightheadedness, dizziness, or cough. No respiratory distress. Denies any abdominal discomfort. Denies N&V. Denies B&B concerns. Denies dysuria or frequency. Denies loose or constipation.  Incontinent of B&B. Appetite good, but requires feeding assistance. Sleeping well. No reports of pain complaints.     BP Readings from Last 3 Encounters:   10/04/23 107/61   08/04/23 116/73   07/19/23 106/74     Wt Readings from Last 5 Encounters:   10/04/23  50.9 kg (112 lb 3.2 oz)   08/04/23 52 kg (114 lb 9.6 oz)   07/19/23 84.6 kg (186 lb 9.6 oz)     ALLERGIES:Chlorpromazine; Fluphenazine; and Tuberculin, ppd  PAST MEDICAL HISTORY:   Past Medical History:   Diagnosis Date    Constipation     Dementia (H)     Depression     Dysphagia     Hip fracture (H) 06/25/2023    right     PAST SURGICAL HISTORY:   has a past surgical history that includes Hip Fracture Surgery (Right, 06/25/2023).  FAMILY HISTORY: family history is not on file.  SOCIAL HISTORY:  reports that he has quit smoking. His smoking use included cigarettes. He has quit using smokeless tobacco. He reports that he does not currently use drugs after having used the following drugs: Cocaine.    MEDICATIONS:  MED REC REQUIRED  Post Medication Reconciliation Status:  Patient was not discharged from an inpatient facility or TCU         Review of your medicines            Accurate as of October 4, 2023  4:02 PM. If you have any questions, ask your nurse or doctor.                CONTINUE these medicines which have NOT CHANGED        Dose / Directions   acetaminophen 500 MG tablet  Commonly known as: TYLENOL      Dose: 1,000 mg  Take 1,000 mg by mouth 3 times daily  Refills: 0     aspirin 81 MG EC tablet      Dose: 81 mg  Take 81 mg by mouth daily  Refills: 0     bisacodyl 10 MG suppository  Commonly known as: DULCOLAX      Dose: 10 mg  Place 10 mg rectally daily as needed for constipation  Refills: 0     cholecalciferol 50 MCG (2000 UT) tablet      Dose: 1 tablet  Take 1 tablet by mouth daily  Refills: 0     melatonin 3 MG Caps      Dose: 3 mg  Take 3 mg by mouth nightly as needed  Refills: 0     * senna-docusate 8.6-50 MG tablet  Commonly known as: SENOKOT-S/PERICOLACE      Dose: 1 tablet  Take 1 tablet by mouth daily as needed for constipation  Refills: 0     * senna-docusate 8.6-50 MG tablet  Commonly known as: SENOKOT-S/PERICOLACE      Dose: 1 tablet  Take 1 tablet by mouth daily  Refills: 0     sertraline 50  "MG tablet  Commonly known as: ZOLOFT      Dose: 50 mg  Take 50 mg by mouth daily  Refills: 0           * This list has 2 medication(s) that are the same as other medications prescribed for you. Read the directions carefully, and ask your doctor or other care provider to review them with you.                 Case Management:  I have reviewed the care plan and MDS and do agree with the plan. Patient's desire to return to the community is not assessible due to cognitive impairment. Information reviewed:  Medications, vital signs, orders, and nursing notes.    ROS:  Unobtainable secondary to cognitive impairment.     Vitals:  /61   Pulse 68   Temp 98.2  F (36.8  C)   Resp 16   Ht 1.676 m (5' 6\")   Wt 50.9 kg (112 lb 3.2 oz)   SpO2 97%   BMI 18.11 kg/m    Body mass index is 18.11 kg/m .  Exam:  GENERAL APPEARANCE:  Alert, in no distress, cooperative  ENT:  Mouth and posterior oropharynx normal, moist mucous membranes, [place]  EYES:  EOM, conjunctivae, lids, pupils and irises normal  NECK:  No adenopathy,masses or thyromegaly  RESP:  respiratory effort and palpation of chest normal, lungs clear to auscultation , no respiratory distress  CV:  Palpation and auscultation of heart done , regular rate and rhythm, no murmur, rub, or gallop, peripheral edema 1+ in BLE  ABDOMEN:  normal bowel sounds, soft, nontender, no hepatosplenomegaly or other masses, no guarding or rebound  M/S:   Wheelchair bound. Requires A2 for all transfers cares and mobility  SKIN:  Inspection of skin and subcutaneous tissue baseline, Palpation of skin and subcutaneous tissue baseline  NEURO:   Cranial nerves 2-12 are normal tested and grossly at patient's baseline, no purposeful movement in upper and lower extremities  PSYCH:  oriented to self, insight and judgement impaired, memory impaired , affect and mood normal    Lab/Diagnostic data:   Most Recent 3 CBC's:  Recent Labs   Lab Test 08/09/23  0827 08/04/22  1135 04/05/22  1127 " 03/22/22  0600   WBC  --  5.3 7.8 10.5   HGB 10.1* 11.7* 11.1* 9.7*   MCV  --  100 102* 105*   PLT  --  183 224 229     Most Recent 3 BMP's:  Recent Labs   Lab Test 09/15/22  0713 08/04/22  1135 04/05/22  1127    138 137   POTASSIUM 3.8 3.5 3.5   CHLORIDE 108 102 107   CO2 26 26 25   BUN 13 11.4 13   CR 0.61* 0.72 0.78   ANIONGAP 6 10 5   SUNIL 8.8 9.2 8.9   GLC 70 79 99     Most Recent TSH and T4:  Recent Labs   Lab Test 10/21/22  0750   TSH 3.19     Most Recent Anemia Panel:  Recent Labs   Lab Test 08/09/23  0827 08/04/22  1135   WBC  --  5.3   HGB 10.1* 11.7*   HCT  --  35.9*   MCV  --  100   PLT  --  183     Vitamin D Deficiency Screening Results:  Lab Results   Component Value Date    VITDT 57 08/09/2023    VITDT 51 10/21/2022    VITDT 73 07/21/2021       ASSESSMENT/PLAN  (F01.50) Vascular dementia without behavioral disturbance (H)  (primary encounter diagnosis)  (F43.10) PTSD (post-traumatic stress disorder)  Comment: Chronic. Progressive. Recent BIMS 7/15 with PHQ9 score 2. Resides in memory care  Plan:   -Monitor for worsening s/symptoms of concerns  -Weight loss to be expected. Most recent weight 112.2# and have been stable for the past 6 months.   -Monitor mood and behaviors  -Monitor for worsening mobility, eating and sleeping patterns  -Continue melatonin 3mg PRN  -Continue sertraline 50mg daily--GDR in future if able.   -CMP, CBC, vitamin D, vitamin B12, and lipid profile due 10/6/23    (Z86.73) History of CVA (cerebrovascular accident)  (R13.10) Dysphagia, unspecified type  (I10) HTN (hypertension), benign  (E78.5) Hyperlipidemia, unspecified hyperlipidemia type  Comment: Chronic. Based on JNC-8 goals,  patients age of 82 year old, no presence of diabetes or CKD, and goals of care goal BP is <150/90 mm Hg. Patient is stable and continue without pharmacological invention with routine assessment..SBP trends around 130s. Also mild R facial droop which correlates with old MRI in 2015   Plan:    -Monitor for worsening s/symptoms of concerns  -Continue daily asa 81mg daily as directed  -Monitor BP and HR as directed  -Continue dysphagia diet with puree and HTL as directed  -CMP, CBC, vitamin D, vitamin B12, and lipid profile due 10/6/23    (S72.001D) Closed fracture of right hip with routine healing, subsequent encounter  (M81.0) Osteoporosis, unspecified osteoporosis type, unspecified pathological fracture presence  (M54.50,  G89.29) Chronic low back pain, unspecified back pain laterality, unspecified whether sciatica present  (E55.9) Vitamin D deficiency  Comment: Chronic. Fracture noted in June 2023. Followed by ortho with last visit in July 2023-they advised to follow up with repeat x rays within 1 month which was not fully completed. Xrays were done 8/23/23 with results revealing: No evidence of dislocation or fracture. Stable alignment of history of ORIF  Plan  -Monitor pain complaints  -Continue Tylenol 1000mg TID  -May benefit from topical agent in future if pain worsens.   -Continue vitamin D 2000U daily  -Monitor fall risks  -Follow up with orthopedic as directed PRN  -Can WBAT to R LE with help of PT. No gait restrictions, but high falls risk.  -CMP, CBC, vitamin D, vitamin B12, and lipid profile due 10/6/23    (K59.00) Constipation, unspecified constipation type  Comment: Chronic. Stable.  Plan:   -Monitor BM patterns  -Bisacodyl supp PRN  -Continue senna S daily and PRN    (D62) ABLA (acute blood loss anemia)  Comment: Chronic. Baseline hgb~ 10  Plan:   -Monitor for bleeding risks  -Monitor for falls  -CMP, CBC, vitamin D, vitamin B12, and lipid profile due 10/6/23    (F10.21) Alcohol dependence in remission (H)  Comment: Chronic. Stable. Unknown last ETOH  Plan:   -Monitor for worsening s/symptoms of concerns  -Continue without ETOH products  -CMP, CBC, vitamin D, vitamin B12, and lipid profile due 10/6/23      Electronically signed by:  Dr. Tanya Washington DNP, APRN, FNP-C, WCS-C,  EDS-C

## 2023-10-08 ENCOUNTER — LAB REQUISITION (OUTPATIENT)
Dept: LAB | Facility: CLINIC | Age: 83
End: 2023-10-08
Payer: COMMERCIAL

## 2023-10-08 DIAGNOSIS — E78.5 HYPERLIPIDEMIA, UNSPECIFIED: ICD-10-CM

## 2023-10-08 DIAGNOSIS — E55.9 VITAMIN D DEFICIENCY, UNSPECIFIED: ICD-10-CM

## 2023-10-08 DIAGNOSIS — I15.1 HYPERTENSION SECONDARY TO OTHER RENAL DISORDERS: ICD-10-CM

## 2023-10-09 LAB
ALBUMIN SERPL BCG-MCNC: 4 G/DL (ref 3.5–5.2)
ALP SERPL-CCNC: 68 U/L (ref 40–129)
ALT SERPL W P-5'-P-CCNC: 19 U/L (ref 0–70)
ANION GAP SERPL CALCULATED.3IONS-SCNC: 10 MMOL/L (ref 7–15)
AST SERPL W P-5'-P-CCNC: 23 U/L (ref 0–45)
BASO+EOS+MONOS # BLD AUTO: ABNORMAL 10*3/UL
BASO+EOS+MONOS NFR BLD AUTO: ABNORMAL %
BASOPHILS # BLD AUTO: 0 10E3/UL (ref 0–0.2)
BASOPHILS NFR BLD AUTO: 0 %
BILIRUB SERPL-MCNC: 0.3 MG/DL
BUN SERPL-MCNC: 20.8 MG/DL (ref 8–23)
CALCIUM SERPL-MCNC: 9.4 MG/DL (ref 8.8–10.2)
CHLORIDE SERPL-SCNC: 105 MMOL/L (ref 98–107)
CHOLEST SERPL-MCNC: 146 MG/DL
CREAT SERPL-MCNC: 0.81 MG/DL (ref 0.67–1.17)
DEPRECATED HCO3 PLAS-SCNC: 25 MMOL/L (ref 22–29)
EGFRCR SERPLBLD CKD-EPI 2021: 89 ML/MIN/1.73M2
EOSINOPHIL # BLD AUTO: 0.1 10E3/UL (ref 0–0.7)
EOSINOPHIL NFR BLD AUTO: 2 %
ERYTHROCYTE [DISTWIDTH] IN BLOOD BY AUTOMATED COUNT: 13.9 % (ref 10–15)
GLUCOSE SERPL-MCNC: 104 MG/DL (ref 70–99)
HCT VFR BLD AUTO: 32.7 % (ref 40–53)
HDLC SERPL-MCNC: 72 MG/DL
HGB BLD-MCNC: 9.9 G/DL (ref 13.3–17.7)
IMM GRANULOCYTES # BLD: 0 10E3/UL
IMM GRANULOCYTES NFR BLD: 0 %
LDLC SERPL CALC-MCNC: 55 MG/DL
LYMPHOCYTES # BLD AUTO: 1.9 10E3/UL (ref 0.8–5.3)
LYMPHOCYTES NFR BLD AUTO: 28 %
MCH RBC QN AUTO: 30.1 PG (ref 26.5–33)
MCHC RBC AUTO-ENTMCNC: 30.3 G/DL (ref 31.5–36.5)
MCV RBC AUTO: 99 FL (ref 78–100)
MONOCYTES # BLD AUTO: 0.4 10E3/UL (ref 0–1.3)
MONOCYTES NFR BLD AUTO: 6 %
NEUTROPHILS # BLD AUTO: 4.2 10E3/UL (ref 1.6–8.3)
NEUTROPHILS NFR BLD AUTO: 64 %
NONHDLC SERPL-MCNC: 74 MG/DL
NRBC # BLD AUTO: 0 10E3/UL
NRBC BLD AUTO-RTO: 0 /100
PLATELET # BLD AUTO: 217 10E3/UL (ref 150–450)
POTASSIUM SERPL-SCNC: 4 MMOL/L (ref 3.4–5.3)
PROT SERPL-MCNC: 7.6 G/DL (ref 6.4–8.3)
RBC # BLD AUTO: 3.29 10E6/UL (ref 4.4–5.9)
SODIUM SERPL-SCNC: 140 MMOL/L (ref 135–145)
TRIGL SERPL-MCNC: 95 MG/DL
VIT B12 SERPL-MCNC: 430 PG/ML (ref 232–1245)
VIT D+METAB SERPL-MCNC: 58 NG/ML (ref 20–50)
WBC # BLD AUTO: 6.5 10E3/UL (ref 4–11)

## 2023-10-09 PROCEDURE — 82306 VITAMIN D 25 HYDROXY: CPT | Mod: ORL | Performed by: NURSE PRACTITIONER

## 2023-10-09 PROCEDURE — 80053 COMPREHEN METABOLIC PANEL: CPT | Mod: ORL | Performed by: NURSE PRACTITIONER

## 2023-10-09 PROCEDURE — 80061 LIPID PANEL: CPT | Mod: ORL | Performed by: NURSE PRACTITIONER

## 2023-10-09 PROCEDURE — 82607 VITAMIN B-12: CPT | Mod: ORL | Performed by: NURSE PRACTITIONER

## 2023-10-09 PROCEDURE — 36415 COLL VENOUS BLD VENIPUNCTURE: CPT | Mod: ORL | Performed by: NURSE PRACTITIONER

## 2023-10-09 PROCEDURE — 85025 COMPLETE CBC W/AUTO DIFF WBC: CPT | Mod: ORL | Performed by: NURSE PRACTITIONER

## 2023-10-12 ENCOUNTER — DOCUMENTATION ONLY (OUTPATIENT)
Dept: OTHER | Facility: CLINIC | Age: 83
End: 2023-10-12
Payer: COMMERCIAL

## 2023-11-17 DIAGNOSIS — L85.3 DRY SKIN: Primary | ICD-10-CM

## 2023-11-17 RX ORDER — AMMONIUM LACTATE 12 G/100G
CREAM TOPICAL
Qty: 385 G | Refills: 11 | Status: SHIPPED | OUTPATIENT
Start: 2023-11-17 | End: 2023-12-08 | Stop reason: DRUGHIGH

## 2023-12-07 VITALS
DIASTOLIC BLOOD PRESSURE: 70 MMHG | TEMPERATURE: 96.2 F | BODY MASS INDEX: 18.54 KG/M2 | SYSTOLIC BLOOD PRESSURE: 133 MMHG | HEIGHT: 66 IN | RESPIRATION RATE: 18 BRPM | WEIGHT: 115.4 LBS | OXYGEN SATURATION: 99 % | HEART RATE: 55 BPM

## 2023-12-08 ENCOUNTER — NURSING HOME VISIT (OUTPATIENT)
Dept: GERIATRICS | Facility: CLINIC | Age: 83
End: 2023-12-08
Payer: COMMERCIAL

## 2023-12-08 DIAGNOSIS — Z86.73 HISTORY OF CVA (CEREBROVASCULAR ACCIDENT): ICD-10-CM

## 2023-12-08 DIAGNOSIS — D64.9 ANEMIA, UNSPECIFIED TYPE: ICD-10-CM

## 2023-12-08 DIAGNOSIS — F01.50 VASCULAR DEMENTIA WITHOUT BEHAVIORAL DISTURBANCE (H): Primary | ICD-10-CM

## 2023-12-08 PROCEDURE — 99309 SBSQ NF CARE MODERATE MDM 30: CPT | Performed by: INTERNAL MEDICINE

## 2023-12-08 RX ORDER — AMMONIUM LACTATE 12 G/100G
CREAM TOPICAL DAILY PRN
COMMUNITY

## 2023-12-08 NOTE — LETTER
12/8/2023        RE: Yue Wang  C/o Amanda Wang  [address] [place] [address]        [facility]  PHYSICIAN NOTE    Chief Complaint   Patient presents with     correction Regulatory       HPI:    Yue Wang is a 83 year old  (1940), who is being seen today for a federally mandated E/M visit at [facility] [place]. He resides on memory care unit; [facility] 4/30 in summer 2023. He has h/o dementia with CVA, HTN, past alcohol use, as well as R hip fracture in June 2023 s/p ORIF. Due to dysphagia, he is on a pureed diet with honey thickened liquids and needs help with feeding from staff.     Yue is seen resting in bed today.  Awakes to voice.  He remains soft-spoken and answers questions in one-word replies.  Denies pain, dyspnea or concerns.  I asked if he likes the food and he says yes.  Weight is stable in 110s.  No acute nursing concerns although they are planning to adjust one of the foot pedals for his manual wheelchair as it got bent somehow.    Per , his sister is his primary contact and has been for quite some time.  However, she lives out of state.  His wife recently started calling and asking about him.  It is uncertain if he has power of  documentation on file.  Of note, he had originally been followed by the [facility] team so he is newer to our [place] team as of July 2023.      Past Medical History:   Diagnosis Date     Constipation      Dementia (H)      Depression      Dysphagia      Hip fracture (H) 06/25/2023    right        CODE STATUS: DNR/I    ALLERGIES: Chlorpromazine; Fluphenazine; and Tuberculin, ppd    MEDICATIONS: Reviewed and updated in [facility] according to facility MAR  Current Outpatient Medications   Medication Sig Dispense Refill     acetaminophen (TYLENOL) 500 MG tablet Take 1,000 mg by mouth 3 times daily       ammonium lactate (AMLACTIN) 12 % external cream Apply topically daily as needed for dry skin (on  "heels)       aspirin 81 MG EC tablet Take 81 mg by mouth daily       bisacodyl (DULCOLAX) 10 MG suppository Place 10 mg rectally daily as needed for constipation       melatonin 3 MG CAPS Take 3 mg by mouth nightly as needed       senna-docusate (SENOKOT-S/PERICOLACE) 8.6-50 MG tablet Take 1 tablet by mouth 2 times daily as needed for constipation       senna-docusate (SENOKOT-S/PERICOLACE) 8.6-50 MG tablet Take 1 tablet by mouth 2 times daily       sertraline (ZOLOFT) 50 MG tablet Take 50 mg by mouth daily         ROS:  Limited secondary to cognitive impairment but today pt reports as above in HPI    Exam:  /70   Pulse 55   Temp (!) 96.2  F (35.7  C)   Resp 18   Ht 1.676 m (5' 6\")   Wt 52.3 kg (115 lb 6.4 oz)   SpO2 99%   BMI 18.63 kg/m    Resting in bed but awakes to voice, casually dressed  Moist oral mucosa  Breathing nonlabored on room air, no cough  Heart tones regular  No lower extremity edema  Abdomen soft, nontender to palpation  Very soft voice with short answers to questions    Lab/Diagnostic Data:    Per CareEverywhere [facility] labs: June 2023 Hgb 8.6, Cr 0.78     Most Recent 3 CBC's:  Recent Labs   Lab Test 10/09/23  1122 08/09/23  0827 08/04/22  1135 04/05/22  1127   WBC 6.5  --  5.3 7.8   HGB 9.9* 10.1* 11.7* 11.1*   MCV 99  --  100 102*     --  183 224     Most Recent 3 BMP's:  Recent Labs   Lab Test 10/09/23  1122 09/15/22  0713 08/04/22  1135    140 138   POTASSIUM 4.0 3.8 3.5   CHLORIDE 105 108 102   CO2 25 26 26   BUN 20.8 13 11.4   CR 0.81 0.61* 0.72   ANIONGAP 10 6 10   SUNIL 9.4 8.8 9.2   * 70 79     Most Recent 2 LFT's:  Recent Labs   Lab Test 10/09/23  1122   AST 23   ALT 19   ALKPHOS 68   BILITOTAL 0.3     Most Recent TSH and T4:  Recent Labs   Lab Test 10/21/22  0750   TSH 3.19     Most Recent Vitamin D 58 and extra supplementation stopped and Vitamin B12 430 in Oct 2023    ASSESSMENT/PLAN:  Vascular dementia without behavioral disturbance (H)  History " "of CVA (cerebrovascular accident)  Appreciate the care and support of long-term care facility staff where he resides on the memory care unit  No behaviors of concern and remains on low-dose sertraline for mood with history of depression  He continues to be soft-spoken and somewhat parkinsonian in appearance  Staff will make adjustments to the foot pedal on his wheelchair  Continues on aspirin; see below due to some mild anemia which persists after his hip fracture earlier this year will need to follow his hemoglobin  Discussed with  regarding whether or not he has power of  documentation on file and they will have to check into this  SLUMS 4/30 in summer 2023 scanned into [facility]    Anemia, unspecified type   Hemoglobin low after his hip fracture and repair earlier this year though it did improve somewhat  Appears hemoglobin dedrick was down to 8.6 as of June 2023 and now it is up to 9.9  Normal MCV  No iron studies on file but he does have a normal vitamin B-12 level of 430 from October 2023  No current signs or symptoms of active blood loss known  He does have constipation which requires Senna S to keep in mind should iron therapy ever been started  Weight stable and he denies abdominal pain when I palpate his abdomen, however, consider addition of GI protection anti-acid while he is on chronic aspirin therapy with history of stroke and concurrent SSRI  Will start with repeat CBC along with iron studies and go from there; labs ordered for Monday, December 11  Due to advanced dementia would not advocate for invasive GI work up, but adding iron and/or potentially anti-acid may be helpful from a comfort perspective to avoid any GI distress and help reduce fatigue from potential contributor of anemia    H/o R hip fracture s/p ORIF June 2023  Osteoporosis  Had high vitamin D level so supplement discontinued  Per CareEverywhere note with [facility] on 8/31/23:  \"Films completed on 8/25/23 at [facility], sent to [facility] " "via PACS for review today. No sign of postop complication or other abnormality s/p CRPP of R hip fx. No need for further formal Ortho f/u. Patient may WBAT to R LE.  Hoenisch, Robin Lk, PA-C, 8/31/2023 4:00 PM\"      Electronically signed by:  Roxann Vann DO      Sincerely,        Roxann Vann DO      "

## 2023-12-08 NOTE — Clinical Note
AXELI follow up anemia labs Monday 12/11. See my note as needed for reference for treatment ideas if still significantly anemic. Thanks!

## 2023-12-08 NOTE — PROGRESS NOTES
[facility]  PHYSICIAN NOTE    Chief Complaint   Patient presents with    [facility] Regulatory       HPI:    Yue Wang is a 83 year old  (1940), who is being seen today for a federally mandated E/M visit at [facility]. He resides on memory care unit; [facility] 4/30 in summer 2023. He has h/o dementia with CVA, HTN, past alcohol use, as well as R hip fracture in June 2023 s/p ORIF. Due to dysphagia, he is on a pureed diet with honey thickened liquids and needs help with feeding from staff.     Yue is seen resting in bed today.  Awakes to voice.  He remains soft-spoken and answers questions in one-word replies.  Denies pain, dyspnea or concerns.  I asked if he likes the food and he says yes.  Weight is stable in 110s.  No acute nursing concerns although they are planning to adjust one of the foot pedals for his manual wheelchair as it got bent somehow.    Per , his sister is his primary contact and has been for quite some time.  However, she lives out of state.  His wife recently started calling and asking about him.  It is uncertain if he has power of  documentation on file.  Of note, he had originally been followed by the [facility] team so he is newer to our [place] team as of July 2023.      Past Medical History:   Diagnosis Date    Constipation     Dementia (H)     Depression     Dysphagia     Hip fracture (H) 06/25/2023    right        CODE STATUS: DNR/I    ALLERGIES: Chlorpromazine; Fluphenazine; and Tuberculin, ppd    MEDICATIONS: Reviewed and updated in [facility] according to facility MAR  Current Outpatient Medications   Medication Sig Dispense Refill    acetaminophen (TYLENOL) 500 MG tablet Take 1,000 mg by mouth 3 times daily      ammonium lactate (AMLACTIN) 12 % external cream Apply topically daily as needed for dry skin (on heels)      aspirin 81 MG EC tablet Take 81 mg by mouth daily      bisacodyl (DULCOLAX) 10 MG suppository Place 10  "mg rectally daily as needed for constipation      melatonin 3 MG CAPS Take 3 mg by mouth nightly as needed      senna-docusate (SENOKOT-S/PERICOLACE) 8.6-50 MG tablet Take 1 tablet by mouth 2 times daily as needed for constipation      senna-docusate (SENOKOT-S/PERICOLACE) 8.6-50 MG tablet Take 1 tablet by mouth 2 times daily      sertraline (ZOLOFT) 50 MG tablet Take 50 mg by mouth daily         ROS:  Limited secondary to cognitive impairment but today pt reports as above in HPI    Exam:  /70   Pulse 55   Temp (!) 96.2  F (35.7  C)   Resp 18   Ht 1.676 m (5' 6\")   Wt 52.3 kg (115 lb 6.4 oz)   SpO2 99%   BMI 18.63 kg/m    Resting in bed but awakes to voice, casually dressed  Moist oral mucosa  Breathing nonlabored on room air, no cough  Heart tones regular  No lower extremity edema  Abdomen soft, nontender to palpation  Very soft voice with short answers to questions    Lab/Diagnostic Data:    Per [facility]Everywhere [facility] hospital labs: June 2023 Hgb 8.6, Cr 0.78     Most Recent 3 CBC's:  Recent Labs   Lab Test 10/09/23  1122 08/09/23  0827 08/04/22  1135 04/05/22  1127   WBC 6.5  --  5.3 7.8   HGB 9.9* 10.1* 11.7* 11.1*   MCV 99  --  100 102*     --  183 224     Most Recent 3 BMP's:  Recent Labs   Lab Test 10/09/23  1122 09/15/22  0713 08/04/22  1135    140 138   POTASSIUM 4.0 3.8 3.5   CHLORIDE 105 108 102   CO2 25 26 26   BUN 20.8 13 11.4   CR 0.81 0.61* 0.72   ANIONGAP 10 6 10   SUNIL 9.4 8.8 9.2   * 70 79     Most Recent 2 LFT's:  Recent Labs   Lab Test 10/09/23  1122   AST 23   ALT 19   ALKPHOS 68   BILITOTAL 0.3     Most Recent TSH and T4:  Recent Labs   Lab Test 10/21/22  0750   TSH 3.19     Most Recent Vitamin D 58 and extra supplementation stopped and Vitamin B12 430 in Oct 2023    ASSESSMENT/PLAN:  Vascular dementia without behavioral disturbance (H)  History of CVA (cerebrovascular accident)  Appreciate the care and support of long-term care facility staff where he resides on " "the memory care unit  No behaviors of concern and remains on low-dose sertraline for mood with history of depression  He continues to be soft-spoken and somewhat parkinsonian in appearance  Staff will make adjustments to the foot pedal on his wheelchair  Continues on aspirin; see below due to some mild anemia which persists after his hip fracture earlier this year will need to follow his hemoglobin  Discussed with  regarding whether or not he has power of  documentation on file and they will have to check into this  SLUMS 4/30 in summer 2023 scanned into [facility]    Anemia, unspecified type   Hemoglobin low after his hip fracture and repair earlier this year though it did improve somewhat  Appears hemoglobin dedrick was down to 8.6 as of June 2023 and now it is up to 9.9  Normal MCV  No iron studies on file but he does have a normal vitamin B-12 level of 430 from October 2023  No current signs or symptoms of active blood loss known  He does have constipation which requires Senna S to keep in mind should iron therapy ever been started  Weight stable and he denies abdominal pain when I palpate his abdomen, however, consider addition of GI protection anti-acid while he is on chronic aspirin therapy with history of stroke and concurrent SSRI  Will start with repeat CBC along with iron studies and go from there; labs ordered for Monday, December 11  Due to advanced dementia would not advocate for invasive GI work up, but adding iron and/or potentially anti-acid may be helpful from a comfort perspective to avoid any GI distress and help reduce fatigue from potential contributor of anemia    H/o R hip fracture s/p ORIF June 2023  Osteoporosis  Had high vitamin D level so supplement discontinued  Per CareEverywhere note with [facility] on 8/31/23:  \"Films completed on 8/25/23 at [facility], sent to [facility] via PACS for review today. No sign of postop complication or other abnormality s/p CRPP of R hip fx. No need for " "further formal Ortho f/u. Patient may WBAT to R LE. Hoenisch, Robin Lk, PA-C, 8/31/2023 4:00 PM\"      Electronically signed by:  Roxann Vann DO  "

## 2023-12-09 ENCOUNTER — LAB REQUISITION (OUTPATIENT)
Dept: LAB | Facility: CLINIC | Age: 83
End: 2023-12-09
Payer: COMMERCIAL

## 2023-12-09 DIAGNOSIS — D64.9 ANEMIA, UNSPECIFIED: ICD-10-CM

## 2023-12-11 LAB
ERYTHROCYTE [DISTWIDTH] IN BLOOD BY AUTOMATED COUNT: 13.6 % (ref 10–15)
FERRITIN SERPL-MCNC: 27 NG/ML (ref 31–409)
FOLATE SERPL-MCNC: 7.9 NG/ML (ref 4.6–34.8)
HCT VFR BLD AUTO: 32.3 % (ref 40–53)
HGB BLD-MCNC: 9.8 G/DL (ref 13.3–17.7)
IRON BINDING CAPACITY (ROCHE): 371 UG/DL (ref 240–430)
IRON SATN MFR SERPL: 11 % (ref 15–46)
IRON SERPL-MCNC: 41 UG/DL (ref 61–157)
MCH RBC QN AUTO: 29.5 PG (ref 26.5–33)
MCHC RBC AUTO-ENTMCNC: 30.3 G/DL (ref 31.5–36.5)
MCV RBC AUTO: 97 FL (ref 78–100)
PLATELET # BLD AUTO: 253 10E3/UL (ref 150–450)
RBC # BLD AUTO: 3.32 10E6/UL (ref 4.4–5.9)
WBC # BLD AUTO: 6.7 10E3/UL (ref 4–11)

## 2023-12-11 PROCEDURE — 85027 COMPLETE CBC AUTOMATED: CPT | Mod: ORL | Performed by: INTERNAL MEDICINE

## 2023-12-11 PROCEDURE — 82746 ASSAY OF FOLIC ACID SERUM: CPT | Mod: ORL | Performed by: INTERNAL MEDICINE

## 2023-12-11 PROCEDURE — 36415 COLL VENOUS BLD VENIPUNCTURE: CPT | Mod: ORL | Performed by: INTERNAL MEDICINE

## 2023-12-11 PROCEDURE — 83550 IRON BINDING TEST: CPT | Mod: ORL | Performed by: INTERNAL MEDICINE

## 2023-12-11 PROCEDURE — 82728 ASSAY OF FERRITIN: CPT | Mod: ORL | Performed by: INTERNAL MEDICINE

## 2023-12-27 ENCOUNTER — DOCUMENTATION ONLY (OUTPATIENT)
Dept: GERIATRICS | Facility: CLINIC | Age: 83
End: 2023-12-27
Payer: COMMERCIAL

## 2024-02-13 VITALS
DIASTOLIC BLOOD PRESSURE: 56 MMHG | TEMPERATURE: 98 F | BODY MASS INDEX: 18.58 KG/M2 | WEIGHT: 115.6 LBS | SYSTOLIC BLOOD PRESSURE: 111 MMHG | RESPIRATION RATE: 18 BRPM | OXYGEN SATURATION: 99 % | HEART RATE: 78 BPM | HEIGHT: 66 IN

## 2024-02-13 PROBLEM — F14.11 COCAINE ABUSE IN REMISSION (H): Status: RESOLVED | Noted: 2023-07-19 | Resolved: 2024-02-13

## 2024-02-13 NOTE — PROGRESS NOTES
[facility]  Chief Complaint   Patient presents with    [facility] Medical Record Number:  [id number]  Place of Service where encounter took place:  [facility] ([facility] ) ([place]) [[id number]]    HPI:    Yue Wang  is 83 year old (1940), who is being seen today for a federally mandated E/M visit. Today's concerns are:     Vascular dementia without behavioral disturbance (H)  History of CVA (cerebrovascular accident)  Anemia, unspecified type  Dry skin  PTSD (post-traumatic stress disorder)  Dysphagia, unspecified type  HTN (hypertension), benign  Hyperlipidemia, unspecified hyperlipidemia type  Alcohol dependence in remission (H)  ABLA (acute blood loss anemia)  Constipation, unspecified constipation type  Vitamin D deficiency  Chronic low back pain, unspecified back pain laterality, unspecified whether sciatica present  Osteoporosis, unspecified osteoporosis type, unspecified pathological fracture presence  Closed fracture of right hip with routine healing, subsequent encounter    Met with patient who remains nonverbal.  No respiratory distress noted. Incontinent of B&B. Maximum assistance for all ADLs, transfers and cares. Appetite fair. Sleeping well. No behaviors or recent falls reported.     BP Readings from Last 3 Encounters:   02/13/24 111/56   12/07/23 133/70   10/04/23 107/61     Wt Readings from Last 5 Encounters:   02/13/24 52.4 kg (115 lb 9.6 oz)   12/07/23 52.3 kg (115 lb 6.4 oz)   10/04/23 50.9 kg (112 lb 3.2 oz)   08/04/23 52 kg (114 lb 9.6 oz)   07/19/23 84.6 kg (186 lb 9.6 oz)     ALLERGIES:Chlorpromazine; Fluphenazine; and Tuberculin, ppd  PAST MEDICAL HISTORY:   Past Medical History:   Diagnosis Date    Constipation     Dementia (H)     Depression     Dysphagia     Hip fracture (H) 06/25/2023    right     PAST SURGICAL HISTORY:   has a past surgical history that includes Hip Fracture Surgery (Right, 06/25/2023).  FAMILY  HISTORY: family history is not on file.  SOCIAL HISTORY:  reports that he has quit smoking. His smoking use included cigarettes. He has quit using smokeless tobacco. He reports that he does not currently use drugs after having used the following drugs: Cocaine.    MEDICATIONS:  MED REC REQUIRED  Post Medication Reconciliation Status:  Medication reconciliation previously completed during another office visit         Review of your medicines            Accurate as of February 14, 2024 12:10 PM. If you have any questions, ask your nurse or doctor.                CONTINUE these medicines which have NOT CHANGED        Dose / Directions   acetaminophen 500 MG tablet  Commonly known as: TYLENOL      Dose: 1,000 mg  Take 1,000 mg by mouth 3 times daily  Refills: 0     ammonium lactate 12 % external cream  Commonly known as: AMLACTIN      Apply topically daily as needed for dry skin (on heels)  Refills: 0     aspirin 81 MG EC tablet      Dose: 81 mg  Take 81 mg by mouth daily  Refills: 0     bisacodyl 10 MG suppository  Commonly known as: DULCOLAX      Dose: 10 mg  Place 10 mg rectally daily as needed for constipation  Refills: 0     melatonin 3 MG Caps      Dose: 3 mg  Take 3 mg by mouth nightly as needed  Refills: 0     * senna-docusate 8.6-50 MG tablet  Commonly known as: SENOKOT-S/PERICOLACE      Dose: 1 tablet  Take 1 tablet by mouth 2 times daily as needed for constipation  Refills: 0     * senna-docusate 8.6-50 MG tablet  Commonly known as: SENOKOT-S/PERICOLACE      Dose: 1 tablet  Take 1 tablet by mouth 2 times daily  Refills: 0     sertraline 50 MG tablet  Commonly known as: ZOLOFT      Dose: 50 mg  Take 50 mg by mouth daily  Refills: 0           * This list has 2 medication(s) that are the same as other medications prescribed for you. Read the directions carefully, and ask your doctor or other care provider to review them with you.                 Case Management:  I have reviewed the care plan and MDS and do  "agree with the plan. Patient's desire to return to the community is not assessible due to cognitive impairment. Information reviewed:  Medications, vital signs, orders, and nursing notes.    ROS:  Unobtainable secondary to cognitive impairment.     Vitals:  /56   Pulse 78   Temp 98  F (36.7  C)   Resp 18   Ht 1.676 m (5' 6\")   Wt 52.4 kg (115 lb 9.6 oz)   SpO2 99%   BMI 18.66 kg/m    Body mass index is 18.66 kg/m .  Exam:  GENERAL APPEARANCE:  Alert, cooperative  ENT:  Mouth and posterior oropharynx normal, moist mucous membranes, [place]  EYES:  EOM, conjunctivae, lids, pupils and irises normal  NECK:  No adenopathy,masses or thyromegaly  RESP:  respiratory effort and palpation of chest normal, lungs clear to auscultation , no respiratory distress  CV:  Palpation and auscultation of heart done , regular rate and rhythm, no murmur, rub, or gallop, no edema, +2 pedal pulses  ABDOMEN:  normal bowel sounds, soft, nontender, no hepatosplenomegaly or other masses, no guarding or rebound  M/S:   [place] lift. Maximum assistance for all ADLs, transfers and cares  SKIN:  Inspection of skin and subcutaneous tissue baseline, Palpation of skin and subcutaneous tissue baseline  NEURO:   Cranial nerves 2-12 are normal tested and grossly at patient's baseline, no purposeful movement in upper and lower extremities  PSYCH:  oriented to self, insight and judgement impaired, memory impaired , affect and mood normal    Lab/Diagnostic data:   Most Recent 3 CBC's:  Recent Labs   Lab Test 12/11/23  1005 10/09/23  1122 08/09/23  0827 08/04/22  1135   WBC 6.7 6.5  --  5.3   HGB 9.8* 9.9* 10.1* 11.7*   MCV 97 99  --  100    217  --  183     Most Recent 3 BMP's:  Recent Labs   Lab Test 10/09/23  1122 09/15/22  0713 08/04/22  1135    140 138   POTASSIUM 4.0 3.8 3.5   CHLORIDE 105 108 102   CO2 25 26 26   BUN 20.8 13 11.4   CR 0.81 0.61* 0.72   ANIONGAP 10 6 10   SUNIL 9.4 8.8 9.2   * 70 79     Most Recent 2 " LFT's:  Recent Labs   Lab Test 10/09/23  1122   AST 23   ALT 19   ALKPHOS 68   BILITOTAL 0.3     Most Recent Anemia Panel:  Recent Labs   Lab Test 12/11/23  1005 10/09/23  1122   WBC 6.7 6.5   HGB 9.8* 9.9*   HCT 32.3* 32.7*   MCV 97 99    217   IRON 41*  --    IRONSAT 11*  --      --    YASIR 27*  --    B12  --  430   FOLIC 7.9  --        ASSESSMENT/PLAN  (F01.50) Vascular dementia without behavioral disturbance (H)  (primary encounter diagnosis)  (F43.10) PTSD (post-traumatic stress disorder)  Comment: Chronic. Progressive. Recent BIMS 7/15 with PHQ9 score 2. Resides in memory care  Plan:   -Monitor for worsening s/symptoms of concerns  -Weight loss to be expected. Most recent weight 115# and have been stable for the past 6 months.   -Monitor mood and behaviors  -Monitor for worsening mobility, eating and sleeping patterns  -Continue melatonin 3mg PRN  -Continue sertraline 50mg daily--GDR in future if able.   -Trend labs stable. Trend routinely and PRN     (Z86.73) History of CVA (cerebrovascular accident)  (R13.10) Dysphagia, unspecified type  (I10) HTN (hypertension), benign  (E78.5) Hyperlipidemia, unspecified hyperlipidemia type  Comment: Chronic. Based on JNC-8 goals,  patients age of 82 year old, no presence of diabetes or CKD, and goals of care goal BP is <150/90 mm Hg. Patient is stable and continue without pharmacological invention with routine assessment..SBP trends around 130s. Also mild R facial droop which correlates with old MRI in 2015   Plan:   -Monitor for worsening s/symptoms of concerns  -Continue daily asa 81mg daily as directed  -Monitor BP and HR as directed  -Continue dysphagia diet with puree and HTL as directed  -Trend labs stable. Trend routinely and PRN     (S72.001D) Closed fracture of right hip with routine healing, subsequent encounter  (M81.0) Osteoporosis, unspecified osteoporosis type, unspecified pathological fracture presence  (M54.50,  G89.29) Chronic low back pain,  unspecified back pain laterality, unspecified whether sciatica present  (E55.9) Vitamin D deficiency  Comment: Chronic. Fracture noted in June 2023. Followed by ortho with last visit in July 2023-they advised to follow up with repeat x rays within 1 month which was not fully completed. Xrays were done 8/23/23 with results revealing: No evidence of dislocation or fracture. Stable alignment of history of ORIF  Plan  -Monitor pain complaints  -Continue Tylenol 1000mg TID  -May benefit from topical agent in future if pain worsens.   -Continue vitamin D 2000U daily  -Monitor fall risks  -Follow up with orthopedic as directed PRN  -Can WBAT to R LE with help of PT. No gait restrictions, but high falls risk.  -Trend labs stable. Trend routinely and PRN     (K59.00) Constipation, unspecified constipation type  Comment: Chronic. Stable.  Plan:   -Monitor BM patterns  -Bisacodyl supp PRN  -Continue senna S daily and PRN     (D62) ABLA (acute blood loss anemia)  Comment: Chronic. Baseline hgb~ 10  Plan:   -Monitor for bleeding risks  -Monitor for falls  -Trend labs stable. Trend routinely and PRN     (F10.21) Alcohol dependence in remission (H)  Comment: Chronic. Stable. Unknown last ETOH  Plan:   -Monitor for worsening s/symptoms of concerns  -Continue without ETOH products        Electronically signed by:  Dr. Tanya [place] DNP, APRN, FNP-C, WCS-C, EDS-C

## 2024-02-14 ENCOUNTER — NURSING HOME VISIT (OUTPATIENT)
Dept: GERIATRICS | Facility: CLINIC | Age: 84
End: 2024-02-14
Payer: COMMERCIAL

## 2024-02-14 DIAGNOSIS — G89.29 CHRONIC LOW BACK PAIN, UNSPECIFIED BACK PAIN LATERALITY, UNSPECIFIED WHETHER SCIATICA PRESENT: ICD-10-CM

## 2024-02-14 DIAGNOSIS — Z86.73 HISTORY OF CVA (CEREBROVASCULAR ACCIDENT): ICD-10-CM

## 2024-02-14 DIAGNOSIS — F01.50 VASCULAR DEMENTIA WITHOUT BEHAVIORAL DISTURBANCE (H): Primary | ICD-10-CM

## 2024-02-14 DIAGNOSIS — K59.00 CONSTIPATION, UNSPECIFIED CONSTIPATION TYPE: ICD-10-CM

## 2024-02-14 DIAGNOSIS — E78.5 HYPERLIPIDEMIA, UNSPECIFIED HYPERLIPIDEMIA TYPE: ICD-10-CM

## 2024-02-14 DIAGNOSIS — D64.9 ANEMIA, UNSPECIFIED TYPE: ICD-10-CM

## 2024-02-14 DIAGNOSIS — M81.0 OSTEOPOROSIS, UNSPECIFIED OSTEOPOROSIS TYPE, UNSPECIFIED PATHOLOGICAL FRACTURE PRESENCE: ICD-10-CM

## 2024-02-14 DIAGNOSIS — F10.21 ALCOHOL DEPENDENCE IN REMISSION (H): ICD-10-CM

## 2024-02-14 DIAGNOSIS — M54.50 CHRONIC LOW BACK PAIN, UNSPECIFIED BACK PAIN LATERALITY, UNSPECIFIED WHETHER SCIATICA PRESENT: ICD-10-CM

## 2024-02-14 DIAGNOSIS — S72.001D CLOSED FRACTURE OF RIGHT HIP WITH ROUTINE HEALING, SUBSEQUENT ENCOUNTER: ICD-10-CM

## 2024-02-14 DIAGNOSIS — E55.9 VITAMIN D DEFICIENCY: ICD-10-CM

## 2024-02-14 DIAGNOSIS — F43.10 PTSD (POST-TRAUMATIC STRESS DISORDER): ICD-10-CM

## 2024-02-14 DIAGNOSIS — D62 ABLA (ACUTE BLOOD LOSS ANEMIA): ICD-10-CM

## 2024-02-14 DIAGNOSIS — I10 HTN (HYPERTENSION), BENIGN: ICD-10-CM

## 2024-02-14 DIAGNOSIS — L85.3 DRY SKIN: ICD-10-CM

## 2024-02-14 DIAGNOSIS — R13.10 DYSPHAGIA, UNSPECIFIED TYPE: ICD-10-CM

## 2024-02-14 PROCEDURE — 99309 SBSQ NF CARE MODERATE MDM 30: CPT | Performed by: NURSE PRACTITIONER

## 2024-02-14 NOTE — LETTER
2/14/2024        RE: Yue Wang  C/o Amanda Wang  [address] [facility] [address]        [facility] GERIATRICS  Chief Complaint   Patient presents with     [facility] Medical Record Number:  [id number]  Place of Service where encounter took place:  [facility] ([facility] ([place]) [[id number]]    HPI:    Yue Wang  is 83 year old (1940), who is being seen today for a federally mandated E/M visit. Today's concerns are:     Vascular dementia without behavioral disturbance (H)  History of CVA (cerebrovascular accident)  Anemia, unspecified type  Dry skin  PTSD (post-traumatic stress disorder)  Dysphagia, unspecified type  HTN (hypertension), benign  Hyperlipidemia, unspecified hyperlipidemia type  Alcohol dependence in remission (H)  ABLA (acute blood loss anemia)  Constipation, unspecified constipation type  Vitamin D deficiency  Chronic low back pain, unspecified back pain laterality, unspecified whether sciatica present  Osteoporosis, unspecified osteoporosis type, unspecified pathological fracture presence  Closed fracture of right hip with routine healing, subsequent encounter    Met with patient who remains nonverbal.  No respiratory distress noted. Incontinent of B&B. Maximum assistance for all ADLs, transfers and cares. Appetite fair. Sleeping well. No behaviors or recent falls reported.     BP Readings from Last 3 Encounters:   02/13/24 111/56   12/07/23 133/70   10/04/23 107/61     Wt Readings from Last 5 Encounters:   02/13/24 52.4 kg (115 lb 9.6 oz)   12/07/23 52.3 kg (115 lb 6.4 oz)   10/04/23 50.9 kg (112 lb 3.2 oz)   08/04/23 52 kg (114 lb 9.6 oz)   07/19/23 84.6 kg (186 lb 9.6 oz)     ALLERGIES:Chlorpromazine; Fluphenazine; and Tuberculin, ppd  PAST MEDICAL HISTORY:   Past Medical History:   Diagnosis Date     Constipation      Dementia (H)      Depression      Dysphagia      Hip fracture (H) 06/25/2023    right     PAST  SURGICAL HISTORY:   has a past surgical history that includes Hip Fracture Surgery (Right, 06/25/2023).  FAMILY HISTORY: family history is not on file.  SOCIAL HISTORY:  reports that he has quit smoking. His smoking use included cigarettes. He has quit using smokeless tobacco. He reports that he does not currently use drugs after having used the following drugs: Cocaine.    MEDICATIONS:  MED REC REQUIRED  Post Medication Reconciliation Status:  Medication reconciliation previously completed during another office visit         Review of your medicines            Accurate as of February 14, 2024 12:10 PM. If you have any questions, ask your nurse or doctor.                CONTINUE these medicines which have NOT CHANGED        Dose / Directions   acetaminophen 500 MG tablet  Commonly known as: TYLENOL      Dose: 1,000 mg  Take 1,000 mg by mouth 3 times daily  Refills: 0     ammonium lactate 12 % external cream  Commonly known as: AMLACTIN      Apply topically daily as needed for dry skin (on heels)  Refills: 0     aspirin 81 MG EC tablet      Dose: 81 mg  Take 81 mg by mouth daily  Refills: 0     bisacodyl 10 MG suppository  Commonly known as: DULCOLAX      Dose: 10 mg  Place 10 mg rectally daily as needed for constipation  Refills: 0     melatonin 3 MG Caps      Dose: 3 mg  Take 3 mg by mouth nightly as needed  Refills: 0     * senna-docusate 8.6-50 MG tablet  Commonly known as: SENOKOT-S/PERICOLACE      Dose: 1 tablet  Take 1 tablet by mouth 2 times daily as needed for constipation  Refills: 0     * senna-docusate 8.6-50 MG tablet  Commonly known as: SENOKOT-S/PERICOLACE      Dose: 1 tablet  Take 1 tablet by mouth 2 times daily  Refills: 0     sertraline 50 MG tablet  Commonly known as: ZOLOFT      Dose: 50 mg  Take 50 mg by mouth daily  Refills: 0           * This list has 2 medication(s) that are the same as other medications prescribed for you. Read the directions carefully, and ask your doctor or other care  "provider to review them with you.                 Case Management:  I have reviewed the care plan and MDS and do agree with the plan. Patient's desire to return to the community is not assessible due to cognitive impairment. Information reviewed:  Medications, vital signs, orders, and nursing notes.    ROS:  Unobtainable secondary to cognitive impairment.     Vitals:  /56   Pulse 78   Temp 98  F (36.7  C)   Resp 18   Ht 1.676 m (5' 6\")   Wt 52.4 kg (115 lb 9.6 oz)   SpO2 99%   BMI 18.66 kg/m    Body mass index is 18.66 kg/m .  Exam:  GENERAL APPEARANCE:  Alert, cooperative  ENT:  Mouth and posterior oropharynx normal, moist mucous membranes, [place]  EYES:  EOM, conjunctivae, lids, pupils and irises normal  NECK:  No adenopathy,masses or thyromegaly  RESP:  respiratory effort and palpation of chest normal, lungs clear to auscultation , no respiratory distress  CV:  Palpation and auscultation of heart done , regular rate and rhythm, no murmur, rub, or gallop, no edema, +2 pedal pulses  ABDOMEN:  normal bowel sounds, soft, nontender, no hepatosplenomegaly or other masses, no guarding or rebound  M/S:   [place] lift. Maximum assistance for all ADLs, transfers and cares  SKIN:  Inspection of skin and subcutaneous tissue baseline, Palpation of skin and subcutaneous tissue baseline  NEURO:   Cranial nerves 2-12 are normal tested and grossly at patient's baseline, no purposeful movement in upper and lower extremities  PSYCH:  oriented to self, insight and judgement impaired, memory impaired , affect and mood normal    Lab/Diagnostic data:   Most Recent 3 CBC's:  Recent Labs   Lab Test 12/11/23  1005 10/09/23  1122 08/09/23  0827 08/04/22  1135   WBC 6.7 6.5  --  5.3   HGB 9.8* 9.9* 10.1* 11.7*   MCV 97 99  --  100    217  --  183     Most Recent 3 BMP's:  Recent Labs   Lab Test 10/09/23  1122 09/15/22  0713 08/04/22  1135    140 138   POTASSIUM 4.0 3.8 3.5   CHLORIDE 105 108 102   CO2 25 26 26   BUN " 20.8 13 11.4   CR 0.81 0.61* 0.72   ANIONGAP 10 6 10   SUNIL 9.4 8.8 9.2   * 70 79     Most Recent 2 LFT's:  Recent Labs   Lab Test 10/09/23  1122   AST 23   ALT 19   ALKPHOS 68   BILITOTAL 0.3     Most Recent Anemia Panel:  Recent Labs   Lab Test 12/11/23  1005 10/09/23  1122   WBC 6.7 6.5   HGB 9.8* 9.9*   HCT 32.3* 32.7*   MCV 97 99    217   IRON 41*  --    IRONSAT 11*  --      --    YASIR 27*  --    B12  --  430   FOLIC 7.9  --        ASSESSMENT/PLAN  (F01.50) Vascular dementia without behavioral disturbance (H)  (primary encounter diagnosis)  (F43.10) PTSD (post-traumatic stress disorder)  Comment: Chronic. Progressive. Recent BIMS 7/15 with PHQ9 score 2. Resides in memory care  Plan:   -Monitor for worsening s/symptoms of concerns  -Weight loss to be expected. Most recent weight 115# and have been stable for the past 6 months.   -Monitor mood and behaviors  -Monitor for worsening mobility, eating and sleeping patterns  -Continue melatonin 3mg PRN  -Continue sertraline 50mg daily--GDR in future if able.   -Trend labs stable. Trend routinely and PRN     (Z86.73) History of CVA (cerebrovascular accident)  (R13.10) Dysphagia, unspecified type  (I10) HTN (hypertension), benign  (E78.5) Hyperlipidemia, unspecified hyperlipidemia type  Comment: Chronic. Based on JNC-8 goals,  patients age of 82 year old, no presence of diabetes or CKD, and goals of care goal BP is <150/90 mm Hg. Patient is stable and continue without pharmacological invention with routine assessment..SBP trends around 130s. Also mild R facial droop which correlates with old MRI in 2015   Plan:   -Monitor for worsening s/symptoms of concerns  -Continue daily asa 81mg daily as directed  -Monitor BP and HR as directed  -Continue dysphagia diet with puree and HTL as directed  -Trend labs stable. Trend routinely and PRN     (S72.001D) Closed fracture of right hip with routine healing, subsequent encounter  (M81.0) Osteoporosis,  unspecified osteoporosis type, unspecified pathological fracture presence  (M54.50,  G89.29) Chronic low back pain, unspecified back pain laterality, unspecified whether sciatica present  (E55.9) Vitamin D deficiency  Comment: Chronic. Fracture noted in June 2023. Followed by ortho with last visit in July 2023-they advised to follow up with repeat x rays within 1 month which was not fully completed. Xrays were done 8/23/23 with results revealing: No evidence of dislocation or fracture. Stable alignment of history of ORIF  Plan  -Monitor pain complaints  -Continue Tylenol 1000mg TID  -May benefit from topical agent in future if pain worsens.   -Continue vitamin D 2000U daily  -Monitor fall risks  -Follow up with orthopedic as directed PRN  -Can WBAT to R LE with help of PT. No gait restrictions, but high falls risk.  -Trend labs stable. Trend routinely and PRN     (K59.00) Constipation, unspecified constipation type  Comment: Chronic. Stable.  Plan:   -Monitor BM patterns  -Bisacodyl supp PRN  -Continue senna S daily and PRN     (D62) ABLA (acute blood loss anemia)  Comment: Chronic. Baseline hgb~ 10  Plan:   -Monitor for bleeding risks  -Monitor for falls  -Trend labs stable. Trend routinely and PRN     (F10.21) Alcohol dependence in remission (H)  Comment: Chronic. Stable. Unknown last ETOH  Plan:   -Monitor for worsening s/symptoms of concerns  -Continue without ETOH products        Electronically signed by:  Dr. Tanya Washington DNP, APRN, FNP-C, WCS-C, EDS-C           Sincerely,        Tanya Washington, JENY CNP

## 2024-02-26 ENCOUNTER — PATIENT OUTREACH (OUTPATIENT)
Dept: GERIATRIC MEDICINE | Facility: CLINIC | Age: 84
End: 2024-02-26
Payer: COMMERCIAL

## 2024-02-27 NOTE — PROGRESS NOTES
[facility] Care Coordination Contact  [facility] Six-Month Assessment    6 month assessment completed on 2/26/24 with ANGELINE Patterson and member.    ER visits: No  Hospitalizations: Yes -  6/24/23 for hip fracture  TCU stays: No  Significant health status changes: None  Falls/Injuries: No  ADL/IADL changes: No, remains dependent.    Reviewed Institutional Assessment and updated as needed.     Met Yue in the dining room where he was eating lunch.  He was very sleepy, unable to communicate much with CC.  Per ANGELINE Banegas he has been stable.    Will see member in 6 months for an annual health risk assessment.   Encouraged member to call CC with any questions or concerns in the meantime.     XANDER Prather, [facility]  [phone]

## 2024-02-28 ENCOUNTER — PATIENT OUTREACH (OUTPATIENT)
Dept: GERIATRIC MEDICINE | Facility: CLINIC | Age: 84
End: 2024-02-28
Payer: COMMERCIAL

## 2024-02-28 NOTE — PROGRESS NOTES
[facility] Care Coordination Contact    CHW attempted to reach out Mbr if received by mail/submitted MA renewal. Called Mbr's sister LVM.  CHW will follow up w/Mbr's sister again.    LILLY Leal  [facility]  [phone]

## 2024-04-22 ENCOUNTER — NURSING HOME VISIT (OUTPATIENT)
Dept: GERIATRICS | Facility: CLINIC | Age: 84
End: 2024-04-22
Payer: COMMERCIAL

## 2024-04-22 VITALS
OXYGEN SATURATION: 98 % | HEIGHT: 66 IN | BODY MASS INDEX: 18.64 KG/M2 | DIASTOLIC BLOOD PRESSURE: 78 MMHG | TEMPERATURE: 98 F | HEART RATE: 78 BPM | WEIGHT: 116 LBS | SYSTOLIC BLOOD PRESSURE: 123 MMHG | RESPIRATION RATE: 18 BRPM

## 2024-04-22 DIAGNOSIS — D64.9 ANEMIA, UNSPECIFIED TYPE: ICD-10-CM

## 2024-04-22 DIAGNOSIS — F01.50 VASCULAR DEMENTIA WITHOUT BEHAVIORAL DISTURBANCE (H): Primary | ICD-10-CM

## 2024-04-22 DIAGNOSIS — R13.10 DYSPHAGIA, UNSPECIFIED TYPE: ICD-10-CM

## 2024-04-22 PROCEDURE — 99309 SBSQ NF CARE MODERATE MDM 30: CPT | Performed by: INTERNAL MEDICINE

## 2024-04-22 RX ORDER — FERROUS SULFATE 325(65) MG
325 TABLET ORAL
COMMUNITY

## 2024-04-22 NOTE — LETTER
4/22/2024        RE: Yue Wang  C/o Amanda Wang  [address]   [facility] [address]        [place] GERIATRIC SERVICES  PHYSICIAN NOTE    Chief Complaint   Patient presents with     intermediate Regulatory       HPI:    Yue Wang is a 83 year old  (1940), who is being seen today for a federally mandated E/M visit at [facility]. He resides on memory care unit; [facility] 4/30 in summer 2023. He has h/o dementia with CVA, HTN, past alcohol use, as well as R hip fracture in June 2023 s/p ORIF. Due to dysphagia, he is on a pureed diet with honey thickened liquids and needs help with feeding from staff.     Weight and VSS the past several months.     Today I see Yue with RN who is helping assist with dinner.  No acute medical changes recently but some days he is more sleepy than others.  No new known behaviors although sometimes he does try to self transfer out of bed. Always quiet/soft spoken. At times, he will feed himself the puréed diet but other times needs more help from nursing staff.  Per RN, he will also at times choose to stay out in common area after meals.  I speak Yue's name and he slowly opens his eyes but does not choose to and/or show ability to participate in verbal communication during my visit. Later on rounds I see him awake and attentively watching TV with fellow residents.    Past Medical History:   Diagnosis Date     Constipation      Dementia (H)      Depression      Dysphagia      Hip fracture (H) 06/25/2023    right     History of CVA (cerebrovascular accident)         CODE STATUS: DNR/I    ALLERGIES: Chlorpromazine; Fluphenazine; and Tuberculin, ppd    MEDICATIONS: Reviewed and updated in [facility] according to facility MAR  Current Outpatient Medications   Medication Sig Dispense Refill     acetaminophen (TYLENOL) 500 MG tablet Take 1,000 mg by mouth 3 times daily       ammonium lactate (AMLACTIN) 12 % external cream Apply topically daily as needed for dry skin (on  "heels)       aspirin 81 MG EC tablet Take 81 mg by mouth daily       bisacodyl (DULCOLAX) 10 MG suppository Place 10 mg rectally daily as needed for constipation       ferrous sulfate (FEROSUL) 325 (65 Fe) MG tablet Take 325 mg by mouth every other day       melatonin 3 MG CAPS Take 3 mg by mouth nightly as needed       senna-docusate (SENOKOT-S/PERICOLACE) 8.6-50 MG tablet Take 1 tablet by mouth 2 times daily as needed for constipation       senna-docusate (SENOKOT-S/PERICOLACE) 8.6-50 MG tablet Take 1 tablet by mouth 2 times daily       sertraline (ZOLOFT) 50 MG tablet Take 50 mg by mouth daily         ROS:  Unable secondary to cognitive impairment and sleepiness    Exam:  /78   Pulse 78   Temp 98  F (36.7  C)   Resp 18   Ht 1.676 m (5' 6\")   Wt 52.6 kg (116 lb)   SpO2 98%   BMI 18.72 kg/m    Sitting up in wheelchair, casually dressed, no acute distress  Breathing nonlabored, no cough  Does not engage in verbal communication and only slightly opens eyes to voice  Is able to take some sips of beverages and bites of puréed meals with nursing assistance  Later on rounds seen awake/attentive watching TV contently with fellow residents    Lab/Diagnostic Data:   Most Recent Vitamin D 58 and extra supplementation stopped and Vitamin B12 430 in Oct 2023   Dec 2023 low ferritin at 27 as well as low iron counts  Most Recent 3 CBC's:  Recent Labs   Lab Test 12/11/23  1005 10/09/23  1122 08/09/23  0827 08/04/22  1135   WBC 6.7 6.5  --  5.3   HGB 9.8* 9.9* 10.1* 11.7*   MCV 97 99  --  100    217  --  183     Most Recent 3 BMP's:  Recent Labs   Lab Test 10/09/23  1122 09/15/22  0713 08/04/22  1135    140 138   POTASSIUM 4.0 3.8 3.5   CHLORIDE 105 108 102   CO2 25 26 26   BUN 20.8 13 11.4   CR 0.81 0.61* 0.72   ANIONGAP 10 6 10   SUNIL 9.4 8.8 9.2   * 70 79     Most Recent 2 LFT's:  Recent Labs   Lab Test 10/09/23  1122   AST 23   ALT 19   ALKPHOS 68   BILITOTAL 0.3     Most Recent TSH and " T4:  Recent Labs   Lab Test 10/21/22  0750   TSH 3.19     ASSESSMENT/PLAN:  Vascular dementia without behavioral disturbance (H)  In the setting of his advancing dementia some days he is simply more sleepy than others which staff are aware of and is to be expected  At baseline he is pretty mellow without behaviors  Staff in turn help him sometimes more than others depending on his needs and as such today he is needing more help with eating then later perks up as evident on rounds  Continue low dose Sertraline 50 mg daily given past h/o depression and tolerance of therapy  On no agents to cause lethargy; not routinely using the PRN Melatonin    Dysphagia  Continue pureed diet with honey thickened liquids  Weight stable  Appreciate staff help    Anemia, unspecified type  He is on iron supplements since last December with slow recovery from ABLA of hip fracture June 2023 and iron deficiency noted on labs at that time  Recheck CBC and ferritin on 5/1/2024 to see if can taper off of iron supplementation      Electronically signed by:  Roxann Vann, DO      Sincerely,        Roxann Vann DO

## 2024-04-22 NOTE — PROGRESS NOTES
[place] GERIATRIC SERVICES  PHYSICIAN NOTE    Chief Complaint   Patient presents with    care home Regulatory       HPI:    Yue Wang is a 83 year old  (1940), who is being seen today for a federally mandated E/M visit at [facility]. He resides on memory care unit; [facility] 4/30 in summer 2023. He has h/o dementia with CVA, HTN, past alcohol use, as well as R hip fracture in June 2023 s/p ORIF. Due to dysphagia, he is on a pureed diet with honey thickened liquids and needs help with feeding from staff.     Weight and VSS the past several months.     Today I see Yue with RN who is helping assist with dinner.  No acute medical changes recently but some days he is more sleepy than others.  No new known behaviors although sometimes he does try to self transfer out of bed. Always quiet/soft spoken. At times, he will feed himself the puréed diet but other times needs more help from nursing staff.  Per RN, he will also at times choose to stay out in common area after meals.  I speak Yue's name and he slowly opens his eyes but does not choose to and/or show ability to participate in verbal communication during my visit. Later on rounds I see him awake and attentively watching TV with fellow residents.    Past Medical History:   Diagnosis Date    Constipation     Dementia (H)     Depression     Dysphagia     Hip fracture (H) 06/25/2023    right    History of CVA (cerebrovascular accident)         CODE STATUS: DNR/I    ALLERGIES: Chlorpromazine; Fluphenazine; and Tuberculin, ppd    MEDICATIONS: Reviewed and updated in [facility] according to facility MAR  Current Outpatient Medications   Medication Sig Dispense Refill    acetaminophen (TYLENOL) 500 MG tablet Take 1,000 mg by mouth 3 times daily      ammonium lactate (AMLACTIN) 12 % external cream Apply topically daily as needed for dry skin (on heels)      aspirin 81 MG EC tablet Take 81 mg by mouth daily      bisacodyl (DULCOLAX) 10 MG suppository Place 10  "mg rectally daily as needed for constipation      ferrous sulfate (FEROSUL) 325 (65 Fe) MG tablet Take 325 mg by mouth every other day      melatonin 3 MG CAPS Take 3 mg by mouth nightly as needed      senna-docusate (SENOKOT-S/PERICOLACE) 8.6-50 MG tablet Take 1 tablet by mouth 2 times daily as needed for constipation      senna-docusate (SENOKOT-S/PERICOLACE) 8.6-50 MG tablet Take 1 tablet by mouth 2 times daily      sertraline (ZOLOFT) 50 MG tablet Take 50 mg by mouth daily         ROS:  Unable secondary to cognitive impairment and sleepiness    Exam:  /78   Pulse 78   Temp 98  F (36.7  C)   Resp 18   Ht 1.676 m (5' 6\")   Wt 52.6 kg (116 lb)   SpO2 98%   BMI 18.72 kg/m    Sitting up in wheelchair, casually dressed, no acute distress  Breathing nonlabored, no cough  Does not engage in verbal communication and only slightly opens eyes to voice  Is able to take some sips of beverages and bites of puréed meals with nursing assistance  Later on rounds seen awake/attentive watching TV contently with fellow residents    Lab/Diagnostic Data:   Most Recent Vitamin D 58 and extra supplementation stopped and Vitamin B12 430 in Oct 2023   Dec 2023 low ferritin at 27 as well as low iron counts  Most Recent 3 CBC's:  Recent Labs   Lab Test 12/11/23  1005 10/09/23  1122 08/09/23  0827 08/04/22  1135   WBC 6.7 6.5  --  5.3   HGB 9.8* 9.9* 10.1* 11.7*   MCV 97 99  --  100    217  --  183     Most Recent 3 BMP's:  Recent Labs   Lab Test 10/09/23  1122 09/15/22  0713 08/04/22  1135    140 138   POTASSIUM 4.0 3.8 3.5   CHLORIDE 105 108 102   CO2 25 26 26   BUN 20.8 13 11.4   CR 0.81 0.61* 0.72   ANIONGAP 10 6 10   SUNIL 9.4 8.8 9.2   * 70 79     Most Recent 2 LFT's:  Recent Labs   Lab Test 10/09/23  1122   AST 23   ALT 19   ALKPHOS 68   BILITOTAL 0.3     Most Recent TSH and T4:  Recent Labs   Lab Test 10/21/22  0750   TSH 3.19     ASSESSMENT/PLAN:  Vascular dementia without behavioral disturbance " (H)  In the setting of his advancing dementia some days he is simply more sleepy than others which staff are aware of and is to be expected  At baseline he is pretty mellow without behaviors  Staff in turn help him sometimes more than others depending on his needs and as such today he is needing more help with eating then later perks up as evident on rounds  Continue low dose Sertraline 50 mg daily given past h/o depression and tolerance of therapy  On no agents to cause lethargy; not routinely using the PRN Melatonin    Dysphagia  Continue pureed diet with honey thickened liquids  Weight stable  Appreciate staff help    Anemia, unspecified type  He is on iron supplements since last December with slow recovery from ABLA of hip fracture June 2023 and iron deficiency noted on labs at that time  Recheck CBC and ferritin on 5/1/2024 to see if can taper off of iron supplementation      Electronically signed by:  Roxann Vann DO

## 2024-04-30 ENCOUNTER — LAB REQUISITION (OUTPATIENT)
Dept: LAB | Facility: CLINIC | Age: 84
End: 2024-04-30
Payer: COMMERCIAL

## 2024-04-30 DIAGNOSIS — D64.9 ANEMIA, UNSPECIFIED: ICD-10-CM

## 2024-05-01 LAB
ERYTHROCYTE [DISTWIDTH] IN BLOOD BY AUTOMATED COUNT: 12.7 % (ref 10–15)
FERRITIN SERPL-MCNC: 34 NG/ML (ref 31–409)
HCT VFR BLD AUTO: 35 % (ref 40–53)
HGB BLD-MCNC: 11.4 G/DL (ref 13.3–17.7)
MCH RBC QN AUTO: 33.1 PG (ref 26.5–33)
MCHC RBC AUTO-ENTMCNC: 32.6 G/DL (ref 31.5–36.5)
MCV RBC AUTO: 102 FL (ref 78–100)
PLATELET # BLD AUTO: 219 10E3/UL (ref 150–450)
RBC # BLD AUTO: 3.44 10E6/UL (ref 4.4–5.9)
WBC # BLD AUTO: 6.6 10E3/UL (ref 4–11)

## 2024-05-01 PROCEDURE — 85027 COMPLETE CBC AUTOMATED: CPT | Mod: ORL | Performed by: INTERNAL MEDICINE

## 2024-05-01 PROCEDURE — 36415 COLL VENOUS BLD VENIPUNCTURE: CPT | Mod: ORL | Performed by: INTERNAL MEDICINE

## 2024-05-01 PROCEDURE — 82728 ASSAY OF FERRITIN: CPT | Mod: ORL | Performed by: INTERNAL MEDICINE

## 2024-06-28 ENCOUNTER — NURSING HOME VISIT (OUTPATIENT)
Dept: GERIATRICS | Facility: CLINIC | Age: 84
End: 2024-06-28
Payer: COMMERCIAL

## 2024-06-28 VITALS
HEART RATE: 78 BPM | HEIGHT: 66 IN | TEMPERATURE: 97.8 F | OXYGEN SATURATION: 98 % | WEIGHT: 118.6 LBS | RESPIRATION RATE: 18 BRPM | BODY MASS INDEX: 19.06 KG/M2 | SYSTOLIC BLOOD PRESSURE: 120 MMHG | DIASTOLIC BLOOD PRESSURE: 75 MMHG

## 2024-06-28 DIAGNOSIS — S72.001D CLOSED FRACTURE OF RIGHT HIP WITH ROUTINE HEALING, SUBSEQUENT ENCOUNTER: ICD-10-CM

## 2024-06-28 DIAGNOSIS — G89.29 CHRONIC LOW BACK PAIN, UNSPECIFIED BACK PAIN LATERALITY, UNSPECIFIED WHETHER SCIATICA PRESENT: ICD-10-CM

## 2024-06-28 DIAGNOSIS — F43.10 PTSD (POST-TRAUMATIC STRESS DISORDER): ICD-10-CM

## 2024-06-28 DIAGNOSIS — E55.9 VITAMIN D DEFICIENCY: ICD-10-CM

## 2024-06-28 DIAGNOSIS — F01.50 VASCULAR DEMENTIA WITHOUT BEHAVIORAL DISTURBANCE (H): Primary | ICD-10-CM

## 2024-06-28 DIAGNOSIS — S72.001D CLOSED FRACTURE OF RIGHT HIP REQUIRING OPERATIVE REPAIR WITH ROUTINE HEALING, SUBSEQUENT ENCOUNTER: ICD-10-CM

## 2024-06-28 DIAGNOSIS — Z86.73 HISTORY OF CVA (CEREBROVASCULAR ACCIDENT): ICD-10-CM

## 2024-06-28 DIAGNOSIS — I10 HTN (HYPERTENSION), BENIGN: ICD-10-CM

## 2024-06-28 DIAGNOSIS — R13.10 DYSPHAGIA, UNSPECIFIED TYPE: ICD-10-CM

## 2024-06-28 DIAGNOSIS — K59.00 CONSTIPATION, UNSPECIFIED CONSTIPATION TYPE: ICD-10-CM

## 2024-06-28 DIAGNOSIS — E78.5 HYPERLIPIDEMIA, UNSPECIFIED HYPERLIPIDEMIA TYPE: ICD-10-CM

## 2024-06-28 DIAGNOSIS — D62 ABLA (ACUTE BLOOD LOSS ANEMIA): ICD-10-CM

## 2024-06-28 DIAGNOSIS — M54.50 CHRONIC LOW BACK PAIN, UNSPECIFIED BACK PAIN LATERALITY, UNSPECIFIED WHETHER SCIATICA PRESENT: ICD-10-CM

## 2024-06-28 DIAGNOSIS — M81.0 OSTEOPOROSIS, UNSPECIFIED OSTEOPOROSIS TYPE, UNSPECIFIED PATHOLOGICAL FRACTURE PRESENCE: ICD-10-CM

## 2024-06-28 PROCEDURE — 99309 SBSQ NF CARE MODERATE MDM 30: CPT | Performed by: NURSE PRACTITIONER

## 2024-06-28 NOTE — PROGRESS NOTES
[facility] GERIATRICS  Chief Complaint   Patient presents with    [facility] Medical Record Number:  [id number]  Place of Service where encounter took place:  [facility] ([facility] ) ([place]) [[id number]]    HPI:    Yue Wang  is 83 year old (1940), who is being seen today for a federally mandated E/M visit. Today's concerns are:     Vascular dementia without behavioral disturbance (H)  ABLA (acute blood loss anemia)  Constipation, unspecified constipation type  Vitamin D deficiency  Chronic low back pain, unspecified back pain laterality, unspecified whether sciatica present  Osteoporosis, unspecified osteoporosis type, unspecified pathological fracture presence  Closed fracture of right hip with routine healing, subsequent encounter  Closed fracture of right hip requiring operative repair with routine healing, subsequent encounter  History of CVA (cerebrovascular accident)  Dysphagia, unspecified type  HTN (hypertension), benign  Hyperlipidemia, unspecified hyperlipidemia type  PTSD (post-traumatic stress disorder)    Met with patient who was found lying in bed. Limited verbalization today due to aphasia and dementia concerns. No respiratory status concerns. No abdominal discomfort. Oral intake stable. Incontinent of B&B. Sleeping well. No evidence of pain. Nursing denies any acute concerns.     BP Readings from Last 3 Encounters:   06/28/24 120/75   04/22/24 123/78   02/13/24 111/56     Wt Readings from Last 5 Encounters:   06/28/24 53.8 kg (118 lb 9.6 oz)   04/22/24 52.6 kg (116 lb)   02/13/24 52.4 kg (115 lb 9.6 oz)   12/07/23 52.3 kg (115 lb 6.4 oz)   10/04/23 50.9 kg (112 lb 3.2 oz)     ALLERGIES:Chlorpromazine; Fluphenazine; and Tuberculin, ppd  PAST MEDICAL HISTORY:   Past Medical History:   Diagnosis Date    Constipation     Dementia (H)     Depression     Dysphagia     Hip fracture (H) 06/25/2023    right    History of CVA (cerebrovascular  accident)      PAST SURGICAL HISTORY:   has a past surgical history that includes Hip Fracture Surgery (Right, 06/25/2023).  FAMILY HISTORY: family history is not on file.  SOCIAL HISTORY:  reports that he has quit smoking. His smoking use included cigarettes. He has quit using smokeless tobacco. He reports that he does not currently use drugs after having used the following drugs: Cocaine.    MEDICATIONS:  MED REC REQUIRED  Post Medication Reconciliation Status:  Medication reconciliation previously completed during another office visit         Review of your medicines            Accurate as of June 28, 2024  7:58 PM. If you have any questions, ask your nurse or doctor.                CONTINUE these medicines which have NOT CHANGED        Dose / Directions   acetaminophen 500 MG tablet  Commonly known as: TYLENOL      Dose: 1,000 mg  Take 1,000 mg by mouth 3 times daily  Refills: 0     ammonium lactate 12 % external cream  Commonly known as: AMLACTIN      Apply topically daily as needed for dry skin (on heels)  Refills: 0     aspirin 81 MG EC tablet      Dose: 81 mg  Take 81 mg by mouth daily  Refills: 0     bisacodyl 10 MG suppository  Commonly known as: DULCOLAX      Dose: 10 mg  Place 10 mg rectally daily as needed for constipation  Refills: 0     ferrous sulfate 325 (65 Fe) MG tablet  Commonly known as: FEROSUL      Dose: 325 mg  Take 325 mg by mouth every other day  Refills: 0     melatonin 3 MG Caps      Dose: 3 mg  Take 3 mg by mouth nightly as needed  Refills: 0     * senna-docusate 8.6-50 MG tablet  Commonly known as: SENOKOT-S/PERICOLACE      Dose: 1 tablet  Take 1 tablet by mouth 2 times daily as needed for constipation  Refills: 0     * senna-docusate 8.6-50 MG tablet  Commonly known as: SENOKOT-S/PERICOLACE      Dose: 1 tablet  Take 1 tablet by mouth 2 times daily  Refills: 0     sertraline 50 MG tablet  Commonly known as: ZOLOFT      Dose: 50 mg  Take 50 mg by mouth daily  Refills: 0           * This  "list has 2 medication(s) that are the same as other medications prescribed for you. Read the directions carefully, and ask your doctor or other care provider to review them with you.                 Case Management:  I have reviewed the care plan and MDS and do agree with the plan. Patient's desire to return to the community is not assessible due to cognitive impairment. Information reviewed:  Medications, vital signs, orders, and nursing notes.    ROS:  Unobtainable secondary to cognitive impairment.     Vitals:  /75   Pulse 78   Temp 97.8  F (36.6  C)   Resp 18   Ht 1.676 m (5' 6\")   Wt 53.8 kg (118 lb 9.6 oz)   SpO2 98%   BMI 19.14 kg/m    Body mass index is 19.14 kg/m .  Exam:  GENERAL APPEARANCE:  Alert, in no distress, cooperative  ENT:  Mouth and posterior oropharynx normal, moist mucous membranes, [place]  EYES:  EOM, conjunctivae, lids, pupils and irises normal  NECK:  No adenopathy,masses or thyromegaly  RESP:  respiratory effort and palpation of chest normal, lungs clear to auscultation , no respiratory distress  CV:  regular rate and rhythm, no murmur, rub, or gallop, no edema, +2 pedal pulses  ABDOMEN:  normal bowel sounds, soft, nontender, no hepatosplenomegaly or other masses, no guarding or rebound  M/S:   stand pivot transfers. Wheelchair bound. Staf to assist for all ADLs, transfers and cares  SKIN:  Inspection of skin and subcutaneous tissue baseline, Palpation of skin and subcutaneous tissue baseline  NEURO:   Cranial nerves 2-12 are normal tested and grossly at patient's baseline, no purposeful movement in upper and lower extremities  PSYCH:  oriented to self, memory impaired , affect and mood normal    Lab/Diagnostic data:   Most Recent 3 CBC's:  Recent Labs   Lab Test 05/01/24  1026 12/11/23  1005 10/09/23  1122   WBC 6.6 6.7 6.5   HGB 11.4* 9.8* 9.9*   * 97 99    253 217     Most Recent 3 BMP's:  Recent Labs   Lab Test 10/09/23  1122 09/15/22  0713 08/04/22  1135   NA " 140 140 138   POTASSIUM 4.0 3.8 3.5   CHLORIDE 105 108 102   CO2 25 26 26   BUN 20.8 13 11.4   CR 0.81 0.61* 0.72   ANIONGAP 10 6 10   SUNIL 9.4 8.8 9.2   * 70 79     Most Recent Anemia Panel:  Recent Labs   Lab Test 05/01/24  1026 12/11/23  1005 10/09/23  1122 10/09/23  1122   WBC 6.6 6.7  --  6.5   HGB 11.4* 9.8*  --  9.9*   HCT 35.0* 32.3*  --  32.7*   * 97  --  99    253  --  217   IRON  --  41*  --   --    IRONSAT  --  11*  --   --    FEB  --  371  --   --    YASIR 34 27*   < >  --    B12  --   --   --  430   FOLIC  --  7.9  --   --     < > = values in this interval not displayed.       ASSESSMENT/PLAN  (F01.50) Vascular dementia without behavioral disturbance (H)  (primary encounter diagnosis)  (F43.10) PTSD (post-traumatic stress disorder)  Comment: Chronic. Progressive. Resides in memory care  Plan:   -Monitor for worsening s/symptoms of concerns  -Weight loss to be expected. Most recent weight 118# and have been stable for the past 6 months.   -Monitor mood and behaviors  -Monitor for worsening mobility, eating and sleeping patterns  -Continue melatonin 3mg PRN  -Continue sertraline 50mg daily--GDR in future if able.   -CMP, CBC, vitamin B12 and vitamin D due 7/19/24     (Z86.73) History of CVA (cerebrovascular accident)  (R13.10) Dysphagia, unspecified type  (I10) HTN (hypertension), benign  (E78.5) Hyperlipidemia, unspecified hyperlipidemia type  Comment: Chronic. Based on JNC-8 goals,  patients age of 82 year old, no presence of diabetes or CKD, and goals of care goal BP is <150/90 mm Hg. Patient is stable and continue without pharmacological invention with routine assessment..SBP trends around 120s-130s. Also mild R facial droop which correlates with old MRI in 2015   Plan:   -Monitor for worsening s/symptoms of concerns  -Continue daily asa 81mg daily as directed  -Monitor BP and HR as directed  -Continue dysphagia diet with puree and HTL as directed  -CMP, CBC, vitamin B12 and vitamin  D due 7/19/24     (S72.001D) Closed fracture of right hip with routine healing, subsequent encounter  (M81.0) Osteoporosis, unspecified osteoporosis type, unspecified pathological fracture presence  (M54.50,  G89.29) Chronic low back pain, unspecified back pain laterality, unspecified whether sciatica present  (E55.9) Vitamin D deficiency  Comment: Chronic. Fracture noted in June 2023. Followed by ortho with last visit in July 2023-they advised to follow up with repeat x rays within 1 month which was not fully completed. Xrays were done 8/23/23 with results revealing: No evidence of dislocation or fracture. Stable alignment of history of ORIF  Plan  -Monitor pain complaints  -Continue Tylenol 1000mg TID  -May benefit from topical agent in future if pain worsens.   -Continue vitamin D 2000U daily  -Monitor fall risks  -CMP, CBC, vitamin B12 and vitamin D due 7/19/24     (K59.00) Constipation, unspecified constipation type  Comment: Chronic. Stable.  Plan:   -Monitor BM patterns  -Bisacodyl supp PRN  -Continue senna S daily and PRN     (D62) ABLA (acute blood loss anemia)  Comment: Chronic. Baseline hgb~ 10  Plan:   -Monitor for bleeding risks  -Monitor for falls  -Change ferrous sulfate to 3x week dosing for now.   -CMP, CBC, vitamin B12 and vitamin D due 7/19/24     Electronically signed by:  Dr. Tanya Washington DNP, APRN, FNP-C, WCS-C, EDS-C    Provider reviewed records from facility, and interpreted most recent imaging/lab work, and vital signs.   Acute and chronic conditions managed by writer. Have been reviewed during today's exam.

## 2024-06-28 NOTE — LETTER
6/28/2024      Yue Wang  C/o Amanda Wang  [address]   [facility] [address]        [facility] GERIATRICS  Chief Complaint   Patient presents with     [facility] Medical Record Number:  [id number]  Place of Service where encounter took place:  [facility] ([place]) [[id number]]    HPI:    Yue Wang  is 83 year old (1940), who is being seen today for a federally mandated E/M visit. Today's concerns are:     Vascular dementia without behavioral disturbance (H)  ABLA (acute blood loss anemia)  Constipation, unspecified constipation type  Vitamin D deficiency  Chronic low back pain, unspecified back pain laterality, unspecified whether sciatica present  Osteoporosis, unspecified osteoporosis type, unspecified pathological fracture presence  Closed fracture of right hip with routine healing, subsequent encounter  Closed fracture of right hip requiring operative repair with routine healing, subsequent encounter  History of CVA (cerebrovascular accident)  Dysphagia, unspecified type  HTN (hypertension), benign  Hyperlipidemia, unspecified hyperlipidemia type  PTSD (post-traumatic stress disorder)    Met with patient who was found lying in bed. Limited verbalization today due to aphasia and dementia concerns. No respiratory status concerns. No abdominal discomfort. Oral intake stable. Incontinent of B&B. Sleeping well. No evidence of pain. Nursing denies any acute concerns.     BP Readings from Last 3 Encounters:   06/28/24 120/75   04/22/24 123/78   02/13/24 111/56     Wt Readings from Last 5 Encounters:   06/28/24 53.8 kg (118 lb 9.6 oz)   04/22/24 52.6 kg (116 lb)   02/13/24 52.4 kg (115 lb 9.6 oz)   12/07/23 52.3 kg (115 lb 6.4 oz)   10/04/23 50.9 kg (112 lb 3.2 oz)     ALLERGIES:Chlorpromazine; Fluphenazine; and Tuberculin, ppd  PAST MEDICAL HISTORY:   Past Medical History:   Diagnosis Date     Constipation      Dementia (H)       Depression      Dysphagia      Hip fracture (H) 06/25/2023    right     History of CVA (cerebrovascular accident)      PAST SURGICAL HISTORY:   has a past surgical history that includes Hip Fracture Surgery (Right, 06/25/2023).  FAMILY HISTORY: family history is not on file.  SOCIAL HISTORY:  reports that he has quit smoking. His smoking use included cigarettes. He has quit using smokeless tobacco. He reports that he does not currently use drugs after having used the following drugs: Cocaine.    MEDICATIONS:  MED REC REQUIRED  Post Medication Reconciliation Status:  Medication reconciliation previously completed during another office visit         Review of your medicines            Accurate as of June 28, 2024  7:58 PM. If you have any questions, ask your nurse or doctor.                CONTINUE these medicines which have NOT CHANGED        Dose / Directions   acetaminophen 500 MG tablet  Commonly known as: TYLENOL      Dose: 1,000 mg  Take 1,000 mg by mouth 3 times daily  Refills: 0     ammonium lactate 12 % external cream  Commonly known as: AMLACTIN      Apply topically daily as needed for dry skin (on heels)  Refills: 0     aspirin 81 MG EC tablet      Dose: 81 mg  Take 81 mg by mouth daily  Refills: 0     bisacodyl 10 MG suppository  Commonly known as: DULCOLAX      Dose: 10 mg  Place 10 mg rectally daily as needed for constipation  Refills: 0     ferrous sulfate 325 (65 Fe) MG tablet  Commonly known as: FEROSUL      Dose: 325 mg  Take 325 mg by mouth every other day  Refills: 0     melatonin 3 MG Caps      Dose: 3 mg  Take 3 mg by mouth nightly as needed  Refills: 0     * senna-docusate 8.6-50 MG tablet  Commonly known as: SENOKOT-S/PERICOLACE      Dose: 1 tablet  Take 1 tablet by mouth 2 times daily as needed for constipation  Refills: 0     * senna-docusate 8.6-50 MG tablet  Commonly known as: SENOKOT-S/PERICOLACE      Dose: 1 tablet  Take 1 tablet by mouth 2 times daily  Refills: 0     sertraline 50 MG  "tablet  Commonly known as: ZOLOFT      Dose: 50 mg  Take 50 mg by mouth daily  Refills: 0           * This list has 2 medication(s) that are the same as other medications prescribed for you. Read the directions carefully, and ask your doctor or other care provider to review them with you.                 Case Management:  I have reviewed the care plan and MDS and do agree with the plan. Patient's desire to return to the community is not assessible due to cognitive impairment. Information reviewed:  Medications, vital signs, orders, and nursing notes.    ROS:  Unobtainable secondary to cognitive impairment.     Vitals:  /75   Pulse 78   Temp 97.8  F (36.6  C)   Resp 18   Ht 1.676 m (5' 6\")   Wt 53.8 kg (118 lb 9.6 oz)   SpO2 98%   BMI 19.14 kg/m    Body mass index is 19.14 kg/m .  Exam:  GENERAL APPEARANCE:  Alert, in no distress, cooperative  ENT:  Mouth and posterior oropharynx normal, moist mucous membranes, [place]  EYES:  EOM, conjunctivae, lids, pupils and irises normal  NECK:  No adenopathy,masses or thyromegaly  RESP:  respiratory effort and palpation of chest normal, lungs clear to auscultation , no respiratory distress  CV:  regular rate and rhythm, no murmur, rub, or gallop, no edema, +2 pedal pulses  ABDOMEN:  normal bowel sounds, soft, nontender, no hepatosplenomegaly or other masses, no guarding or rebound  M/S:   stand pivot transfers. Wheelchair bound. Staf to assist for all ADLs, transfers and cares  SKIN:  Inspection of skin and subcutaneous tissue baseline, Palpation of skin and subcutaneous tissue baseline  NEURO:   Cranial nerves 2-12 are normal tested and grossly at patient's baseline, no purposeful movement in upper and lower extremities  PSYCH:  oriented to self, memory impaired , affect and mood normal    Lab/Diagnostic data:   Most Recent 3 CBC's:  Recent Labs   Lab Test 05/01/24  1026 12/11/23  1005 10/09/23  1122   WBC 6.6 6.7 6.5   HGB 11.4* 9.8* 9.9*   * 97 99    " 253 217     Most Recent 3 BMP's:  Recent Labs   Lab Test 10/09/23  1122 09/15/22  0713 08/04/22  1135    140 138   POTASSIUM 4.0 3.8 3.5   CHLORIDE 105 108 102   CO2 25 26 26   BUN 20.8 13 11.4   CR 0.81 0.61* 0.72   ANIONGAP 10 6 10   SUNIL 9.4 8.8 9.2   * 70 79     Most Recent Anemia Panel:  Recent Labs   Lab Test 05/01/24  1026 12/11/23  1005 10/09/23  1122 10/09/23  1122   WBC 6.6 6.7  --  6.5   HGB 11.4* 9.8*  --  9.9*   HCT 35.0* 32.3*  --  32.7*   * 97  --  99    253  --  217   IRON  --  41*  --   --    IRONSAT  --  11*  --   --    FEB  --  371  --   --    YASIR 34 27*   < >  --    B12  --   --   --  430   FOLIC  --  7.9  --   --     < > = values in this interval not displayed.       ASSESSMENT/PLAN  (F01.50) Vascular dementia without behavioral disturbance (H)  (primary encounter diagnosis)  (F43.10) PTSD (post-traumatic stress disorder)  Comment: Chronic. Progressive. Resides in memory care  Plan:   -Monitor for worsening s/symptoms of concerns  -Weight loss to be expected. Most recent weight 118# and have been stable for the past 6 months.   -Monitor mood and behaviors  -Monitor for worsening mobility, eating and sleeping patterns  -Continue melatonin 3mg PRN  -Continue sertraline 50mg daily--GDR in future if able.   -CMP, CBC, vitamin B12 and vitamin D due 7/19/24     (Z86.73) History of CVA (cerebrovascular accident)  (R13.10) Dysphagia, unspecified type  (I10) HTN (hypertension), benign  (E78.5) Hyperlipidemia, unspecified hyperlipidemia type  Comment: Chronic. Based on JNC-8 goals,  patients age of 82 year old, no presence of diabetes or CKD, and goals of care goal BP is <150/90 mm Hg. Patient is stable and continue without pharmacological invention with routine assessment..SBP trends around 120s-130s. Also mild R facial droop which correlates with old MRI in 2015   Plan:   -Monitor for worsening s/symptoms of concerns  -Continue daily asa 81mg daily as directed  -Monitor BP and  HR as directed  -Continue dysphagia diet with puree and HTL as directed  -CMP, CBC, vitamin B12 and vitamin D due 7/19/24     (S72.001D) Closed fracture of right hip with routine healing, subsequent encounter  (M81.0) Osteoporosis, unspecified osteoporosis type, unspecified pathological fracture presence  (M54.50,  G89.29) Chronic low back pain, unspecified back pain laterality, unspecified whether sciatica present  (E55.9) Vitamin D deficiency  Comment: Chronic. Fracture noted in June 2023. Followed by ortho with last visit in July 2023-they advised to follow up with repeat x rays within 1 month which was not fully completed. Xrays were done 8/23/23 with results revealing: No evidence of dislocation or fracture. Stable alignment of history of ORIF  Plan  -Monitor pain complaints  -Continue Tylenol 1000mg TID  -May benefit from topical agent in future if pain worsens.   -Continue vitamin D 2000U daily  -Monitor fall risks  -CMP, CBC, vitamin B12 and vitamin D due 7/19/24     (K59.00) Constipation, unspecified constipation type  Comment: Chronic. Stable.  Plan:   -Monitor BM patterns  -Bisacodyl supp PRN  -Continue senna S daily and PRN     (D62) ABLA (acute blood loss anemia)  Comment: Chronic. Baseline hgb~ 10  Plan:   -Monitor for bleeding risks  -Monitor for falls  -Change ferrous sulfate to 3x week dosing for now.   -CMP, CBC, vitamin B12 and vitamin D due 7/19/24     Electronically signed by:  Dr. Tanya Washington DNP, APRN, FNP-C, WCS-C, EDS-C    Provider reviewed records from facility, and interpreted most recent imaging/lab work, and vital signs.   Acute and chronic conditions managed by writer. Have been reviewed during today's exam.              Sincerely,        Tanya Washington, APRN CNP

## 2024-07-05 ENCOUNTER — PATIENT OUTREACH (OUTPATIENT)
Dept: GERIATRIC MEDICINE | Facility: CLINIC | Age: 84
End: 2024-07-05

## 2024-07-06 NOTE — PROGRESS NOTES
[facility] Care Coordination Contact    No longer active with [facility] case management effective 6/30/24.  Reason for disenrollment: XANDER Escoto, [facility] [place]  [phone]

## 2024-07-18 ENCOUNTER — LAB REQUISITION (OUTPATIENT)
Dept: LAB | Facility: CLINIC | Age: 84
End: 2024-07-18
Payer: COMMERCIAL

## 2024-07-18 DIAGNOSIS — M19.90 UNSPECIFIED OSTEOARTHRITIS, UNSPECIFIED SITE: ICD-10-CM

## 2024-07-18 DIAGNOSIS — N18.30 CHRONIC KIDNEY DISEASE, STAGE 3 UNSPECIFIED (H): ICD-10-CM

## 2024-07-18 DIAGNOSIS — I10 ESSENTIAL (PRIMARY) HYPERTENSION: ICD-10-CM

## 2024-07-18 DIAGNOSIS — D64.9 ANEMIA, UNSPECIFIED: ICD-10-CM

## 2024-07-19 LAB
ALBUMIN SERPL BCG-MCNC: 4 G/DL (ref 3.5–5.2)
ALP SERPL-CCNC: 66 U/L (ref 40–150)
ALT SERPL W P-5'-P-CCNC: 22 U/L (ref 0–70)
ANION GAP SERPL CALCULATED.3IONS-SCNC: 8 MMOL/L (ref 7–15)
AST SERPL W P-5'-P-CCNC: 22 U/L (ref 0–45)
BASOPHILS # BLD AUTO: 0 10E3/UL (ref 0–0.2)
BASOPHILS NFR BLD AUTO: 0 %
BILIRUB SERPL-MCNC: 0.3 MG/DL
BUN SERPL-MCNC: 22 MG/DL (ref 8–23)
CALCIUM SERPL-MCNC: 9.1 MG/DL (ref 8.8–10.4)
CHLORIDE SERPL-SCNC: 106 MMOL/L (ref 98–107)
CREAT SERPL-MCNC: 0.9 MG/DL (ref 0.67–1.17)
EGFRCR SERPLBLD CKD-EPI 2021: 85 ML/MIN/1.73M2
EOSINOPHIL # BLD AUTO: 0.1 10E3/UL (ref 0–0.7)
EOSINOPHIL NFR BLD AUTO: 2 %
ERYTHROCYTE [DISTWIDTH] IN BLOOD BY AUTOMATED COUNT: 12.4 % (ref 10–15)
GLUCOSE SERPL-MCNC: 88 MG/DL (ref 70–99)
HCO3 SERPL-SCNC: 27 MMOL/L (ref 22–29)
HCT VFR BLD AUTO: 34.4 % (ref 40–53)
HGB BLD-MCNC: 11.1 G/DL (ref 13.3–17.7)
IMM GRANULOCYTES # BLD: 0 10E3/UL
IMM GRANULOCYTES NFR BLD: 0 %
LYMPHOCYTES # BLD AUTO: 2.2 10E3/UL (ref 0.8–5.3)
LYMPHOCYTES NFR BLD AUTO: 31 %
MCH RBC QN AUTO: 33.3 PG (ref 26.5–33)
MCHC RBC AUTO-ENTMCNC: 32.3 G/DL (ref 31.5–36.5)
MCV RBC AUTO: 103 FL (ref 78–100)
MONOCYTES # BLD AUTO: 0.5 10E3/UL (ref 0–1.3)
MONOCYTES NFR BLD AUTO: 7 %
NEUTROPHILS # BLD AUTO: 4.3 10E3/UL (ref 1.6–8.3)
NEUTROPHILS NFR BLD AUTO: 60 %
NRBC # BLD AUTO: 0 10E3/UL
NRBC BLD AUTO-RTO: 0 /100
PLATELET # BLD AUTO: 203 10E3/UL (ref 150–450)
POTASSIUM SERPL-SCNC: 4.1 MMOL/L (ref 3.4–5.3)
PROT SERPL-MCNC: 7.4 G/DL (ref 6.4–8.3)
RBC # BLD AUTO: 3.33 10E6/UL (ref 4.4–5.9)
SODIUM SERPL-SCNC: 141 MMOL/L (ref 135–145)
VIT D+METAB SERPL-MCNC: 32 NG/ML (ref 20–50)
WBC # BLD AUTO: 7.1 10E3/UL (ref 4–11)

## 2024-07-19 PROCEDURE — 85025 COMPLETE CBC W/AUTO DIFF WBC: CPT | Mod: ORL | Performed by: NURSE PRACTITIONER

## 2024-07-19 PROCEDURE — 82306 VITAMIN D 25 HYDROXY: CPT | Mod: ORL | Performed by: NURSE PRACTITIONER

## 2024-07-19 PROCEDURE — 36415 COLL VENOUS BLD VENIPUNCTURE: CPT | Mod: ORL | Performed by: NURSE PRACTITIONER

## 2024-07-19 PROCEDURE — 80053 COMPREHEN METABOLIC PANEL: CPT | Mod: ORL | Performed by: NURSE PRACTITIONER

## 2024-08-14 ENCOUNTER — NURSING HOME VISIT (OUTPATIENT)
Dept: GERIATRICS | Facility: CLINIC | Age: 84
End: 2024-08-14
Payer: COMMERCIAL

## 2024-08-14 VITALS
RESPIRATION RATE: 17 BRPM | HEIGHT: 66 IN | OXYGEN SATURATION: 98 % | BODY MASS INDEX: 19.35 KG/M2 | WEIGHT: 120.4 LBS | TEMPERATURE: 98 F | SYSTOLIC BLOOD PRESSURE: 118 MMHG | DIASTOLIC BLOOD PRESSURE: 76 MMHG | HEART RATE: 66 BPM

## 2024-08-14 DIAGNOSIS — D64.9 ANEMIA, UNSPECIFIED TYPE: ICD-10-CM

## 2024-08-14 DIAGNOSIS — M81.0 OSTEOPOROSIS, UNSPECIFIED OSTEOPOROSIS TYPE, UNSPECIFIED PATHOLOGICAL FRACTURE PRESENCE: ICD-10-CM

## 2024-08-14 DIAGNOSIS — Z87.81 HISTORY OF FRACTURE OF RIGHT HIP: Primary | ICD-10-CM

## 2024-08-14 DIAGNOSIS — F01.50 VASCULAR DEMENTIA WITHOUT BEHAVIORAL DISTURBANCE (H): ICD-10-CM

## 2024-08-14 PROCEDURE — 99309 SBSQ NF CARE MODERATE MDM 30: CPT | Performed by: INTERNAL MEDICINE

## 2024-08-14 NOTE — LETTER
" 8/14/2024      Yue Wang  C/o Amanda Wang  [address]   [facility] [address]        [facility]  PHYSICIAN NOTE    Chief Complaint   Patient presents with     prison Regulatory       HPI:    Yue Wang is a 83 year old  (1940), who is being seen today for a federally mandated E/M visit at [facility]. He resides on memory care unit; [facility] 4/30 in summer 2023. He has h/o dementia with CVA, HTN, past alcohol use, as well as R hip fracture in June 2023 s/p ORIF. Due to dysphagia, he is on a pureed diet with honey thickened liquids and needs help with feeding from staff.     Recent vitals: Afebrile, -140/60-80, HR 60-80 and weight up a couple pounds over the summer.    Yue is seen in common area for snack time. There will be an activity coming up that he will listen in on; says not interested in participating and it may be hard d/t his advanced dementia. Says softly that he is \"alright\". Denies pain. Is on TID Tylenol from his previous hip fracture and says when I bring up the idea of trying BID to reduce pill burden he likes this idea. RN later confirms he often seems content/comfortable without signs of pain. He denies concerns or things I should know about him. Says the food is good here.    Past Medical History:   Diagnosis Date     Constipation      Dementia (H)      Depression      Dysphagia      Hip fracture (H) 06/25/2023    right     History of CVA (cerebrovascular accident)         CODE STATUS: DNR/I    ALLERGIES: Chlorpromazine; Fluphenazine; and Tuberculin, ppd    MEDICATIONS: Reviewed and updated in [facility] according to facility MAR  Current Outpatient Medications   Medication Sig Dispense Refill     acetaminophen (TYLENOL) 500 MG tablet Take 1,000 mg by mouth 3 times daily       ammonium lactate (AMLACTIN) 12 % external cream Apply topically daily as needed for dry skin (on heels)       aspirin 81 MG EC tablet Take 81 mg by mouth daily       bisacodyl " "(DULCOLAX) 10 MG suppository Place 10 mg rectally daily as needed for constipation       ferrous sulfate (FEROSUL) 325 (65 Fe) MG tablet Take 325 mg by mouth three times a week Give on MWF       senna-docusate (SENOKOT-S/PERICOLACE) 8.6-50 MG tablet Take 1 tablet by mouth 2 times daily as needed for constipation       senna-docusate (SENOKOT-S/PERICOLACE) 8.6-50 MG tablet Take 1 tablet by mouth 2 times daily       sertraline (ZOLOFT) 50 MG tablet Take 50 mg by mouth daily         ROS:  Limited secondary to cognitive impairment but today pt reports as above in HPI    Exam:  /76   Pulse 66   Temp 98  F (36.7  C)   Resp 17   Ht 1.676 m (5' 6\")   Wt 54.6 kg (120 lb 6.4 oz)   SpO2 98%   BMI 19.43 kg/m    Alert, sitting in reclining WC casually dressed  Moist oral mucosa, mild drooling on chin midline  Breathing non-labored on RA, no cough  Abdomen soft, NT  No edema  Soft spoken speech but engages in conversation   Seems overall calm/content    Lab/Diagnostic Data:    Most Recent 3 CBC's:  Recent Labs   Lab Test 07/19/24  1108 05/01/24  1026 12/11/23  1005   WBC 7.1 6.6 6.7   HGB 11.1* 11.4* 9.8*   * 102* 97    219 253     Most Recent 3 BMP's:  Recent Labs   Lab Test 07/19/24  1108 10/09/23  1122 09/15/22  0713    140 140   POTASSIUM 4.1 4.0 3.8   CHLORIDE 106 105 108   CO2 27 25 26   BUN 22.0 20.8 13   CR 0.90 0.81 0.61*   ANIONGAP 8 10 6   SUNIL 9.1 9.4 8.8   GLC 88 104* 70     Most Recent 2 LFT's:  Recent Labs   Lab Test 07/19/24  1108 10/09/23  1122   AST 22 23   ALT 22 19   ALKPHOS 66 68   BILITOTAL 0.3 0.3     Most Recent TSH and T4:  Recent Labs   Lab Test 10/21/22  0750   TSH 3.19     Vitamin D elevated at 58 in Oct 2023 and supplement subsequently stopped --> down to 32 in July 2024    ASSESSMENT/PLAN:  History of fracture of right hip - 2023  Osteoporosis, unspecified osteoporosis type, unspecified pathological fracture presence  Reduced Tylenol from 1000 mg TID to BID d/t lack of " pain and to limit unnecessary polypharmacy after discussing with him and staff  Re-adjust as needed if shows signs of pain  Vitamin D elevated at 58 in Oct 2023 and supplement subsequently stopped --> down to 32 in July 2024  Consider recheck Vitamin D and consider lower dose supplementation (ex: 1000 international unit(s)) based on labs with next CBC    Vascular dementia without behavioral disturbance (H)  In the setting of his advancing dementia some days he is simply more sleepy than others which staff are aware of and is to be expected  At baseline he is pretty mellow without behaviors  Glad he is up for listening in to activity today  Weight up a few pounds and likes the food  Continue low dose Sertraline 50 mg daily given past h/o depression and tolerance of therapy    Anemia, unspecified type  He is on iron supplements since last December with slow recovery from ABLA of hip fracture June 2023 and iron deficiency noted on labs at that time  Now decreased iron to 3x/week given improvement in labs last month  Future CBC with Vitamin D later this year reasonable      Electronically signed by:  Roxann Vann DO      Sincerely,        Roxann Vann, DO

## 2024-08-14 NOTE — PROGRESS NOTES
"[facility]  PHYSICIAN NOTE    Chief Complaint   Patient presents with    shelter Regulatory       HPI:    Yue Wang is a 83 year old  (1940), who is being seen today for a federally mandated E/M visit at [facility]. He resides on memory care unit; [facility] 4/30 in summer 2023. He has h/o dementia with CVA, HTN, past alcohol use, as well as R hip fracture in June 2023 s/p ORIF. Due to dysphagia, he is on a pureed diet with honey thickened liquids and needs help with feeding from staff.     Recent vitals: Afebrile, -140/60-80, HR 60-80 and weight up a couple pounds over the summer.    Yue is seen in common area for snack time. There will be an activity coming up that he will listen in on; says not interested in participating and it may be hard d/t his advanced dementia. Says softly that he is \"alright\". Denies pain. Is on TID Tylenol from his previous hip fracture and says when I bring up the idea of trying BID to reduce pill burden he likes this idea. RN later confirms he often seems content/comfortable without signs of pain. He denies concerns or things I should know about him. Says the food is good here.    Past Medical History:   Diagnosis Date    Constipation     Dementia (H)     Depression     Dysphagia     Hip fracture (H) 06/25/2023    right    History of CVA (cerebrovascular accident)         CODE STATUS: DNR/I    ALLERGIES: Chlorpromazine; Fluphenazine; and Tuberculin, ppd    MEDICATIONS: Reviewed and updated in [facility] according to facility MAR  Current Outpatient Medications   Medication Sig Dispense Refill    acetaminophen (TYLENOL) 500 MG tablet Take 1,000 mg by mouth 3 times daily      ammonium lactate (AMLACTIN) 12 % external cream Apply topically daily as needed for dry skin (on heels)      aspirin 81 MG EC tablet Take 81 mg by mouth daily      bisacodyl (DULCOLAX) 10 MG suppository Place 10 mg rectally daily as needed for constipation      ferrous sulfate " "(FEROSUL) 325 (65 Fe) MG tablet Take 325 mg by mouth three times a week Give on MWF      senna-docusate (SENOKOT-S/PERICOLACE) 8.6-50 MG tablet Take 1 tablet by mouth 2 times daily as needed for constipation      senna-docusate (SENOKOT-S/PERICOLACE) 8.6-50 MG tablet Take 1 tablet by mouth 2 times daily      sertraline (ZOLOFT) 50 MG tablet Take 50 mg by mouth daily         ROS:  Limited secondary to cognitive impairment but today pt reports as above in HPI    Exam:  /76   Pulse 66   Temp 98  F (36.7  C)   Resp 17   Ht 1.676 m (5' 6\")   Wt 54.6 kg (120 lb 6.4 oz)   SpO2 98%   BMI 19.43 kg/m    Alert, sitting in reclining WC casually dressed  Moist oral mucosa, mild drooling on chin midline  Breathing non-labored on RA, no cough  Abdomen soft, NT  No edema  Soft spoken speech but engages in conversation   Seems overall calm/content    Lab/Diagnostic Data:    Most Recent 3 CBC's:  Recent Labs   Lab Test 07/19/24  1108 05/01/24  1026 12/11/23  1005   WBC 7.1 6.6 6.7   HGB 11.1* 11.4* 9.8*   * 102* 97    219 253     Most Recent 3 BMP's:  Recent Labs   Lab Test 07/19/24  1108 10/09/23  1122 09/15/22  0713    140 140   POTASSIUM 4.1 4.0 3.8   CHLORIDE 106 105 108   CO2 27 25 26   BUN 22.0 20.8 13   CR 0.90 0.81 0.61*   ANIONGAP 8 10 6   SUNIL 9.1 9.4 8.8   GLC 88 104* 70     Most Recent 2 LFT's:  Recent Labs   Lab Test 07/19/24  1108 10/09/23  1122   AST 22 23   ALT 22 19   ALKPHOS 66 68   BILITOTAL 0.3 0.3     Most Recent TSH and T4:  Recent Labs   Lab Test 10/21/22  0750   TSH 3.19     Vitamin D elevated at 58 in Oct 2023 and supplement subsequently stopped --> down to 32 in July 2024    ASSESSMENT/PLAN:  History of fracture of right hip - 2023  Osteoporosis, unspecified osteoporosis type, unspecified pathological fracture presence  Reduced Tylenol from 1000 mg TID to BID d/t lack of pain and to limit unnecessary polypharmacy after discussing with him and staff  Re-adjust as needed if " shows signs of pain  Vitamin D elevated at 58 in Oct 2023 and supplement subsequently stopped --> down to 32 in July 2024  Consider recheck Vitamin D and consider lower dose supplementation (ex: 1000 international unit(s)) based on labs with next CBC    Vascular dementia without behavioral disturbance (H)  In the setting of his advancing dementia some days he is simply more sleepy than others which staff are aware of and is to be expected  At baseline he is pretty mellow without behaviors  Glad he is up for listening in to activity today  Weight up a few pounds and likes the food  Continue low dose Sertraline 50 mg daily given past h/o depression and tolerance of therapy    Anemia, unspecified type  He is on iron supplements since last December with slow recovery from ABLA of hip fracture June 2023 and iron deficiency noted on labs at that time  Now decreased iron to 3x/week given improvement in labs last month  Future CBC with Vitamin D later this year reasonable      Electronically signed by:  Roxann Vann,

## 2024-10-14 NOTE — PROGRESS NOTES
[facility]  Chief Complaint   Patient presents with    [facility] Medical Record Number:  [id number]  Place of Service where encounter took place:  [facility] ) ([place]) [[id number]]    HPI:    Yue Wang  is 84 year old (1940), who is being seen today for a federally mandated E/M visit. Today's concerns are:     ABLA (acute blood loss anemia)  Constipation, unspecified constipation type  Vitamin D deficiency  History of fracture of right hip  Osteoporosis, unspecified osteoporosis type, unspecified pathological fracture presence  Chronic low back pain, unspecified back pain laterality, unspecified whether sciatica present  History of CVA (cerebrovascular accident)  Dysphagia, unspecified type  HTN (hypertension), benign  Hyperlipidemia, unspecified hyperlipidemia type  Vascular dementia without behavioral disturbance (H)  PTSD (post-traumatic stress disorder)  Nausea and vomiting, unspecified vomiting type  Pulmonary congestion    Met with patient who was found sitting upright in his wheelchair in day room watching TV. Completed breakfast. Staff reported no concerns, however I noticed very small amounts of emesis on his shirt along with an emesis odor. He does report some abdominal discomfort with nausea today. He denies any chest pain, palpitations, shortness of breath, FLORES, lightheadedness, dizziness, or cough. Incontinent of B&B. LBM on 10/13. Appetite good. Needs feeding assistance. Sleeping well. No complaints of joint pain. Mood stable.     BP Readings from Last 3 Encounters:   10/15/24 (!) 144/82   08/14/24 118/76   06/28/24 120/75     Wt Readings from Last 5 Encounters:   10/15/24 57.2 kg (126 lb)   08/14/24 54.6 kg (120 lb 6.4 oz)   06/28/24 53.8 kg (118 lb 9.6 oz)   04/22/24 52.6 kg (116 lb)   02/13/24 52.4 kg (115 lb 9.6 oz)     ALLERGIES:Chlorpromazine; Fluphenazine; and Tuberculin, ppd  PAST MEDICAL HISTORY:   Past  Medical History:   Diagnosis Date    Constipation     Dementia (H)     Depression     Dysphagia     Hip fracture (H) 06/25/2023    right    History of CVA (cerebrovascular accident)      PAST SURGICAL HISTORY:   has a past surgical history that includes Hip Fracture Surgery (Right, 06/25/2023).  FAMILY HISTORY: family history is not on file.  SOCIAL HISTORY:  reports that he has quit smoking. His smoking use included cigarettes. He has quit using smokeless tobacco. He reports that he does not currently use drugs after having used the following drugs: Cocaine.    MEDICATIONS:  MED REC REQUIRED  Post Medication Reconciliation Status:  Medication reconciliation previously completed during another office visit         Review of your medicines            Accurate as of October 15, 2024 11:59 PM. If you have any questions, ask your nurse or doctor.                START taking        Dose / Directions   ondansetron 4 MG tablet  Commonly known as: ZOFRAN  Used for: Nausea and vomiting, unspecified vomiting type  Started by: Tanya Washington      Dose: 4 mg  Take 1 tablet (4 mg) by mouth every 6 hours as needed for nausea.  Quantity: 30 tablet  Refills: 3            CONTINUE these medicines which have NOT CHANGED        Dose / Directions   acetaminophen 500 MG tablet  Commonly known as: TYLENOL      Dose: 1,000 mg  Take 1,000 mg by mouth 2 times daily  Refills: 0     ammonium lactate 12 % external cream  Commonly known as: AMLACTIN      Apply topically daily as needed for dry skin (on heels)  Refills: 0     aspirin 81 MG EC tablet      Dose: 81 mg  Take 81 mg by mouth daily  Refills: 0     bisacodyl 10 MG suppository  Commonly known as: DULCOLAX      Dose: 10 mg  Place 10 mg rectally daily as needed for constipation  Refills: 0     ferrous sulfate 325 (65 Fe) MG tablet  Commonly known as: FEROSUL      Dose: 325 mg  Take 325 mg by mouth three times a week Give on MWF  Refills: 0     * senna-docusate 8.6-50 MG tablet  Commonly  "known as: SENOKOT-S/PERICOLACE      Dose: 1 tablet  Take 1 tablet by mouth 2 times daily as needed for constipation  Refills: 0     * senna-docusate 8.6-50 MG tablet  Commonly known as: SENOKOT-S/PERICOLACE      Dose: 1 tablet  Take 1 tablet by mouth 2 times daily  Refills: 0     sertraline 50 MG tablet  Commonly known as: ZOLOFT      Dose: 50 mg  Take 50 mg by mouth daily  Refills: 0           * This list has 2 medication(s) that are the same as other medications prescribed for you. Read the directions carefully, and ask your doctor or other care provider to review them with you.                   Where to get your medicines        These medications were sent to [facility] [address] [place] [address]  [address] [facility] [address]      Phone: [phone]   ondansetron 4 MG tablet        Case Management:  I have reviewed the care plan and MDS and do agree with the plan. Patient's desire to return to the community is not assessible due to cognitive impairment. Information reviewed:  Medications, vital signs, orders, and nursing notes.    ROS:  Limited secondary to cognitive impairment but today pt reports as noted per HPI    Vitals:  BP (!) 144/82   Pulse 78   Temp 98.2  F (36.8  C)   Resp 17   Ht 1.676 m (5' 6\")   Wt 57.2 kg (126 lb)   SpO2 98%   BMI 20.34 kg/m    Body mass index is 20.34 kg/m .  Exam:  GENERAL APPEARANCE:  Alert, cooperative  ENT:  Mouth and posterior oropharynx normal, moist mucous membranes, [place]  EYES:  EOM, conjunctivae, lids, pupils and irises normal  NECK:  No adenopathy,masses or thyromegaly  RESP:  respiratory effort and palpation of chest normal, no respiratory distress, rhonchi bibasilar  CV:  regular rate and rhythm, no murmur, rub, or gallop, no edema, +2 pedal pulses  ABDOMEN:  normal bowel sounds, soft, nontender, no hepatosplenomegaly or other masses, no guarding or rebound  M/S:   Wheelchair bound. Staff to assist for all " ADLs, transfers and cares. Nonambulatory.   SKIN:  Inspection of skin and subcutaneous tissue baseline, Palpation of skin and subcutaneous tissue baseline  NEURO:   Cranial nerves 2-12 are normal tested and grossly at patient's baseline, no purposeful movement in upper and lower extremities  PSYCH:  oriented to self, insight and judgement impaired, memory impaired , affect and mood normal    Lab/Diagnostic data:   Most Recent 3 CBC's:  Recent Labs   Lab Test 07/19/24  1108 05/01/24  1026 12/11/23  1005   WBC 7.1 6.6 6.7   HGB 11.1* 11.4* 9.8*   * 102* 97    219 253     Most Recent 3 BMP's:  Recent Labs   Lab Test 07/19/24  1108 10/09/23  1122 09/15/22  0713    140 140   POTASSIUM 4.1 4.0 3.8   CHLORIDE 106 105 108   CO2 27 25 26   BUN 22.0 20.8 13   CR 0.90 0.81 0.61*   ANIONGAP 8 10 6   SUNIL 9.1 9.4 8.8   GLC 88 104* 70     Most Recent 2 LFT's:  Recent Labs   Lab Test 07/19/24  1108 10/09/23  1122   AST 22 23   ALT 22 19   ALKPHOS 66 68   BILITOTAL 0.3 0.3     Most Recent Cholesterol Panel:  Recent Labs   Lab Test 10/09/23  1122   CHOL 146   LDL 55   HDL 72   TRIG 95     Most Recent Anemia Panel:  Recent Labs   Lab Test 07/19/24  1108 05/01/24  1026 12/11/23  1005 10/09/23  1122 10/09/23  1122   WBC 7.1 6.6 6.7  --  6.5   HGB 11.1* 11.4* 9.8*  --  9.9*   HCT 34.4* 35.0* 32.3*  --  32.7*   * 102* 97  --  99    219 253  --  217   IRON  --   --  41*  --   --    IRONSAT  --   --  11*  --   --    FEB  --   --  371  --   --    YASIR  --  34 27*   < >  --    B12  --   --   --   --  430   FOLIC  --   --  7.9  --   --     < > = values in this interval not displayed.     Vitamin D Deficiency Screening Results:  Lab Results   Component Value Date    VITDT 32 07/19/2024    VITDT 58 (H) 10/09/2023    VITDT 57 08/09/2023    VITDT 51 10/21/2022    VITDT 73 07/21/2021       ASSESSMENT/PLAN  (F01.50) Vascular dementia without behavioral disturbance (H)  (primary encounter diagnosis)  (F43.10) PTSD  (post-traumatic stress disorder)  Comment: Chronic. Progressive. Resides in memory care  Plan:   -Monitor for worsening s/symptoms of concerns  -Weight loss to be expected. Most recent weight 126# and have been stable for the past 6 months.   -Monitor mood and behaviors  -Monitor for worsening mobility, eating and sleeping patterns  -Continue melatonin 3mg PRN  -Continue sertraline 50mg daily--GDR in future if able.   -BMP, CBC, vitamin B12 and vitamin D due 10/18/24     (Z86.73) History of CVA (cerebrovascular accident)  (R13.10) Dysphagia, unspecified type  (I10) HTN (hypertension), benign  (E78.5) Hyperlipidemia, unspecified hyperlipidemia type  Comment: Chronic. Based on JNC-8 goals,  patients age of 82 year old, no presence of diabetes or CKD, and goals of care goal BP is <150/90 mm Hg. Patient is stable and continue without pharmacological invention with routine assessment..SBP trends around 130s-140s. Also mild R facial droop which correlates with old MRI in 2015   Plan:   -Monitor for worsening s/symptoms of concerns  -Continue daily asa 81mg daily as directed  -Monitor BP and HR as directed  -Continue dysphagia diet with puree and HTL as directed  -BMP, CBC, vitamin B12 and vitamin D due 10/18/24         (Z87.81) History of fracture of right hip  (M81.0) Osteoporosis, unspecified osteoporosis type, unspecified pathological fracture presence  (M54.50,  G89.29) Chronic low back pain, unspecified back pain laterality, unspecified whether sciatica present  (E55.9) Vitamin D deficiency  Comment: Chronic. Fracture noted in June 2023. Followed by ortho with last visit in July 2023-they advised to follow up with repeat x rays within 1 month which was not fully completed. Xrays were done 8/23/23 with results revealing: No evidence of dislocation or fracture. Stable alignment of history of ORIF  Plan  -Monitor pain complaints  -Continue Tylenol 1000mg BID  -May benefit from topical agent in future if pain worsens.    -Monitor fall risks  -BMP, CBC, vitamin B12 and vitamin D due 10/18/24     (K59.00) Constipation, unspecified constipation type  Comment: Chronic. Stable.  Plan:   -Monitor BM patterns  -Bisacodyl supp PRN  -Continue senna S BID and BID PRN    (R11.2) N&V  (R09.89) Pulmonary congestion  Comment: Acute. Completed breakfast. Staff reported no concerns, however I noticed very small amounts of emesis on his shirt along with an emesis odor. He does report some abdominal discomfort with nausea today. CXR ordered with results below. CXR scan was reviewed and in agreement to negative concerns  Plan:  -CXR 1 view on site to rule out concerns-potential aspiration?  -Start zofran 4mg every 6 hours PRN  -Monitor respiratory status  -Monitor for fevers or changes in respiratory status  -BMP, CBC, vitamin B12 and vitamin D due 10/18/24    Narrative & Impression   EXAM: XR CHEST PORT 1 VIEW  LOCATION: [facility]  DATE: 10/15/2024     INDICATION:  Chest congestion  COMPARISON: None.                                                                      IMPRESSION: Negative chest.        (D62) ABLA (acute blood loss anemia)  Comment: Chronic. Baseline hgb~ 10  Plan:   -Monitor for bleeding risks  -Monitor for falls  -Continue ferrous sulfate 3x week dosing for now.   -BMP, CBC, vitamin B12 and vitamin D due 10/18/24     Electronically signed by:  Dr. Tanya Washington DNP, APRN, FNP-C, WCS-C, EDS-C     Provider reviewed records from facility, and interpreted most recent imaging/lab work, and vital signs.   Acute and chronic conditions managed by writer. Have been reviewed during today's exam.

## 2024-10-15 ENCOUNTER — ANCILLARY PROCEDURE (OUTPATIENT)
Dept: GENERAL RADIOLOGY | Facility: CLINIC | Age: 84
End: 2024-10-15
Attending: NURSE PRACTITIONER

## 2024-10-15 ENCOUNTER — NURSING HOME VISIT (OUTPATIENT)
Dept: GERIATRICS | Facility: CLINIC | Age: 84
End: 2024-10-15
Payer: MEDICARE

## 2024-10-15 VITALS
SYSTOLIC BLOOD PRESSURE: 144 MMHG | RESPIRATION RATE: 17 BRPM | HEIGHT: 66 IN | BODY MASS INDEX: 20.25 KG/M2 | WEIGHT: 126 LBS | OXYGEN SATURATION: 98 % | DIASTOLIC BLOOD PRESSURE: 82 MMHG | TEMPERATURE: 98.2 F | HEART RATE: 78 BPM

## 2024-10-15 DIAGNOSIS — K59.00 CONSTIPATION, UNSPECIFIED CONSTIPATION TYPE: ICD-10-CM

## 2024-10-15 DIAGNOSIS — F01.50 VASCULAR DEMENTIA WITHOUT BEHAVIORAL DISTURBANCE (H): ICD-10-CM

## 2024-10-15 DIAGNOSIS — M81.0 OSTEOPOROSIS, UNSPECIFIED OSTEOPOROSIS TYPE, UNSPECIFIED PATHOLOGICAL FRACTURE PRESENCE: ICD-10-CM

## 2024-10-15 DIAGNOSIS — Z87.81 HISTORY OF FRACTURE OF RIGHT HIP: ICD-10-CM

## 2024-10-15 DIAGNOSIS — M54.50 CHRONIC LOW BACK PAIN, UNSPECIFIED BACK PAIN LATERALITY, UNSPECIFIED WHETHER SCIATICA PRESENT: ICD-10-CM

## 2024-10-15 DIAGNOSIS — F43.10 PTSD (POST-TRAUMATIC STRESS DISORDER): ICD-10-CM

## 2024-10-15 DIAGNOSIS — D62 ABLA (ACUTE BLOOD LOSS ANEMIA): Primary | ICD-10-CM

## 2024-10-15 DIAGNOSIS — R13.10 DYSPHAGIA, UNSPECIFIED TYPE: ICD-10-CM

## 2024-10-15 DIAGNOSIS — E78.5 HYPERLIPIDEMIA, UNSPECIFIED HYPERLIPIDEMIA TYPE: ICD-10-CM

## 2024-10-15 DIAGNOSIS — Z86.73 HISTORY OF CVA (CEREBROVASCULAR ACCIDENT): ICD-10-CM

## 2024-10-15 DIAGNOSIS — E55.9 VITAMIN D DEFICIENCY: ICD-10-CM

## 2024-10-15 DIAGNOSIS — I10 HTN (HYPERTENSION), BENIGN: ICD-10-CM

## 2024-10-15 DIAGNOSIS — G89.29 CHRONIC LOW BACK PAIN, UNSPECIFIED BACK PAIN LATERALITY, UNSPECIFIED WHETHER SCIATICA PRESENT: ICD-10-CM

## 2024-10-15 DIAGNOSIS — R09.89 CHEST CONGESTION: ICD-10-CM

## 2024-10-15 DIAGNOSIS — R11.2 NAUSEA AND VOMITING, UNSPECIFIED VOMITING TYPE: ICD-10-CM

## 2024-10-15 DIAGNOSIS — R09.89 PULMONARY CONGESTION: ICD-10-CM

## 2024-10-15 PROCEDURE — 99309 SBSQ NF CARE MODERATE MDM 30: CPT | Performed by: NURSE PRACTITIONER

## 2024-10-15 PROCEDURE — 71045 X-RAY EXAM CHEST 1 VIEW: CPT

## 2024-10-15 RX ORDER — ONDANSETRON 4 MG/1
4 TABLET, FILM COATED ORAL EVERY 6 HOURS PRN
Qty: 30 TABLET | Refills: 3 | Status: SHIPPED | OUTPATIENT
Start: 2024-10-15

## 2024-10-15 NOTE — LETTER
10/15/2024      Yue Wang  C/o Amanda Wang  [address]   [facility] [address]        [facility] GERIATRICS  Chief Complaint   Patient presents with     skilled nursing [facility] [place] Medical Record Number:  [id number]  Place of Service where encounter took place:  [facility] ([facility]) ([facility]) [[id number]]    HPI:    Yue Wang  is 84 year old (1940), who is being seen today for a federally mandated E/M visit. Today's concerns are:     ABLA (acute blood loss anemia)  Constipation, unspecified constipation type  Vitamin D deficiency  History of fracture of right hip  Osteoporosis, unspecified osteoporosis type, unspecified pathological fracture presence  Chronic low back pain, unspecified back pain laterality, unspecified whether sciatica present  History of CVA (cerebrovascular accident)  Dysphagia, unspecified type  HTN (hypertension), benign  Hyperlipidemia, unspecified hyperlipidemia type  Vascular dementia without behavioral disturbance (H)  PTSD (post-traumatic stress disorder)  Nausea and vomiting, unspecified vomiting type  Pulmonary congestion    Met with patient who was found sitting upright in his wheelchair in day room watching TV. Completed breakfast. Staff reported no concerns, however I noticed very small amounts of emesis on his shirt along with an emesis odor. He does report some abdominal discomfort with nausea today. He denies any chest pain, palpitations, shortness of breath, FLORES, lightheadedness, dizziness, or cough. Incontinent of B&B. LBM on 10/13. Appetite good. Needs feeding assistance. Sleeping well. No complaints of joint pain. Mood stable.     BP Readings from Last 3 Encounters:   10/15/24 (!) 144/82   08/14/24 118/76   06/28/24 120/75     Wt Readings from Last 5 Encounters:   10/15/24 57.2 kg (126 lb)   08/14/24 54.6 kg (120 lb 6.4 oz)   06/28/24 53.8 kg (118 lb 9.6 oz)   04/22/24 52.6 kg (116 lb)   02/13/24 52.4 kg (115 lb 9.6 oz)      ALLERGIES:Chlorpromazine; Fluphenazine; and Tuberculin, ppd  PAST MEDICAL HISTORY:   Past Medical History:   Diagnosis Date     Constipation      Dementia (H)      Depression      Dysphagia      Hip fracture (H) 06/25/2023    right     History of CVA (cerebrovascular accident)      PAST SURGICAL HISTORY:   has a past surgical history that includes Hip Fracture Surgery (Right, 06/25/2023).  FAMILY HISTORY: family history is not on file.  SOCIAL HISTORY:  reports that he has quit smoking. His smoking use included cigarettes. He has quit using smokeless tobacco. He reports that he does not currently use drugs after having used the following drugs: Cocaine.    MEDICATIONS:  MED REC REQUIRED  Post Medication Reconciliation Status:  Medication reconciliation previously completed during another office visit         Review of your medicines            Accurate as of October 15, 2024 11:59 PM. If you have any questions, ask your nurse or doctor.                START taking        Dose / Directions   ondansetron 4 MG tablet  Commonly known as: ZOFRAN  Used for: Nausea and vomiting, unspecified vomiting type  Started by: Tanya Washington      Dose: 4 mg  Take 1 tablet (4 mg) by mouth every 6 hours as needed for nausea.  Quantity: 30 tablet  Refills: 3            CONTINUE these medicines which have NOT CHANGED        Dose / Directions   acetaminophen 500 MG tablet  Commonly known as: TYLENOL      Dose: 1,000 mg  Take 1,000 mg by mouth 2 times daily  Refills: 0     ammonium lactate 12 % external cream  Commonly known as: AMLACTIN      Apply topically daily as needed for dry skin (on heels)  Refills: 0     aspirin 81 MG EC tablet      Dose: 81 mg  Take 81 mg by mouth daily  Refills: 0     bisacodyl 10 MG suppository  Commonly known as: DULCOLAX      Dose: 10 mg  Place 10 mg rectally daily as needed for constipation  Refills: 0     ferrous sulfate 325 (65 Fe) MG tablet  Commonly known as: FEROSUL      Dose: 325 mg  Take 325 mg  "by mouth three times a week Give on MWF  Refills: 0     * senna-docusate 8.6-50 MG tablet  Commonly known as: SENOKOT-S/PERICOLACE      Dose: 1 tablet  Take 1 tablet by mouth 2 times daily as needed for constipation  Refills: 0     * senna-docusate 8.6-50 MG tablet  Commonly known as: SENOKOT-S/PERICOLACE      Dose: 1 tablet  Take 1 tablet by mouth 2 times daily  Refills: 0     sertraline 50 MG tablet  Commonly known as: ZOLOFT      Dose: 50 mg  Take 50 mg by mouth daily  Refills: 0           * This list has 2 medication(s) that are the same as other medications prescribed for you. Read the directions carefully, and ask your doctor or other care provider to review them with you.                   Where to get your medicines        These medications were sent to [facility] - [address]  [address] [place] [id number]      Phone: [phone]   ondansetron 4 MG tablet        Case Management:  I have reviewed the care plan and MDS and do agree with the plan. Patient's desire to return to the community is not assessible due to cognitive impairment. Information reviewed:  Medications, vital signs, orders, and nursing notes.    ROS:  Limited secondary to cognitive impairment but today pt reports as noted per HPI    Vitals:  BP (!) 144/82   Pulse 78   Temp 98.2  F (36.8  C)   Resp 17   Ht 1.676 m (5' 6\")   Wt 57.2 kg (126 lb)   SpO2 98%   BMI 20.34 kg/m    Body mass index is 20.34 kg/m .  Exam:  GENERAL APPEARANCE:  Alert, cooperative  ENT:  Mouth and posterior oropharynx normal, moist mucous membranes, [place]  EYES:  EOM, conjunctivae, lids, pupils and irises normal  NECK:  No adenopathy,masses or thyromegaly  RESP:  respiratory effort and palpation of chest normal, no respiratory distress, rhonchi bibasilar  CV:  regular rate and rhythm, no murmur, rub, or gallop, no edema, +2 pedal pulses  ABDOMEN:  normal bowel sounds, soft, nontender, no " hepatosplenomegaly or other masses, no guarding or rebound  M/S:   Wheelchair bound. Staff to assist for all ADLs, transfers and cares. Nonambulatory.   SKIN:  Inspection of skin and subcutaneous tissue baseline, Palpation of skin and subcutaneous tissue baseline  NEURO:   Cranial nerves 2-12 are normal tested and grossly at patient's baseline, no purposeful movement in upper and lower extremities  PSYCH:  oriented to self, insight and judgement impaired, memory impaired , affect and mood normal    Lab/Diagnostic data:   Most Recent 3 CBC's:  Recent Labs   Lab Test 07/19/24  1108 05/01/24  1026 12/11/23  1005   WBC 7.1 6.6 6.7   HGB 11.1* 11.4* 9.8*   * 102* 97    219 253     Most Recent 3 BMP's:  Recent Labs   Lab Test 07/19/24  1108 10/09/23  1122 09/15/22  0713    140 140   POTASSIUM 4.1 4.0 3.8   CHLORIDE 106 105 108   CO2 27 25 26   BUN 22.0 20.8 13   CR 0.90 0.81 0.61*   ANIONGAP 8 10 6   SUNIL 9.1 9.4 8.8   GLC 88 104* 70     Most Recent 2 LFT's:  Recent Labs   Lab Test 07/19/24  1108 10/09/23  1122   AST 22 23   ALT 22 19   ALKPHOS 66 68   BILITOTAL 0.3 0.3     Most Recent Cholesterol Panel:  Recent Labs   Lab Test 10/09/23  1122   CHOL 146   LDL 55   HDL 72   TRIG 95     Most Recent Anemia Panel:  Recent Labs   Lab Test 07/19/24  1108 05/01/24  1026 12/11/23  1005 10/09/23  1122 10/09/23  1122   WBC 7.1 6.6 6.7  --  6.5   HGB 11.1* 11.4* 9.8*  --  9.9*   HCT 34.4* 35.0* 32.3*  --  32.7*   * 102* 97  --  99    219 253  --  217   IRON  --   --  41*  --   --    IRONSAT  --   --  11*  --   --    FEB  --   --  371  --   --    YASIR  --  34 27*   < >  --    B12  --   --   --   --  430   FOLIC  --   --  7.9  --   --     < > = values in this interval not displayed.     Vitamin D Deficiency Screening Results:  Lab Results   Component Value Date    VITDT 32 07/19/2024    VITDT 58 (H) 10/09/2023    VITDT 57 08/09/2023    VITDT 51 10/21/2022    VITDT 73 07/21/2021        ASSESSMENT/PLAN  (F01.50) Vascular dementia without behavioral disturbance (H)  (primary encounter diagnosis)  (F43.10) PTSD (post-traumatic stress disorder)  Comment: Chronic. Progressive. Resides in memory care  Plan:   -Monitor for worsening s/symptoms of concerns  -Weight loss to be expected. Most recent weight 126# and have been stable for the past 6 months.   -Monitor mood and behaviors  -Monitor for worsening mobility, eating and sleeping patterns  -Continue melatonin 3mg PRN  -Continue sertraline 50mg daily--GDR in future if able.   -BMP, CBC, vitamin B12 and vitamin D due 10/18/24     (Z86.73) History of CVA (cerebrovascular accident)  (R13.10) Dysphagia, unspecified type  (I10) HTN (hypertension), benign  (E78.5) Hyperlipidemia, unspecified hyperlipidemia type  Comment: Chronic. Based on JNC-8 goals,  patients age of 82 year old, no presence of diabetes or CKD, and goals of care goal BP is <150/90 mm Hg. Patient is stable and continue without pharmacological invention with routine assessment..SBP trends around 130s-140s. Also mild R facial droop which correlates with old MRI in 2015   Plan:   -Monitor for worsening s/symptoms of concerns  -Continue daily asa 81mg daily as directed  -Monitor BP and HR as directed  -Continue dysphagia diet with puree and HTL as directed  -BMP, CBC, vitamin B12 and vitamin D due 10/18/24         (Z87.81) History of fracture of right hip  (M81.0) Osteoporosis, unspecified osteoporosis type, unspecified pathological fracture presence  (M54.50,  G89.29) Chronic low back pain, unspecified back pain laterality, unspecified whether sciatica present  (E55.9) Vitamin D deficiency  Comment: Chronic. Fracture noted in June 2023. Followed by ortho with last visit in July 2023-they advised to follow up with repeat x rays within 1 month which was not fully completed. Xrays were done 8/23/23 with results revealing: No evidence of dislocation or fracture. Stable alignment of history  of ORIF  Plan  -Monitor pain complaints  -Continue Tylenol 1000mg BID  -May benefit from topical agent in future if pain worsens.   -Monitor fall risks  -BMP, CBC, vitamin B12 and vitamin D due 10/18/24     (K59.00) Constipation, unspecified constipation type  Comment: Chronic. Stable.  Plan:   -Monitor BM patterns  -Bisacodyl supp PRN  -Continue senna S BID and BID PRN    (R11.2) N&V  (R09.89) Pulmonary congestion  Comment: Acute. Completed breakfast. Staff reported no concerns, however I noticed very small amounts of emesis on his shirt along with an emesis odor. He does report some abdominal discomfort with nausea today. CXR ordered with results below. CXR scan was reviewed and in agreement to negative concerns  Plan:  -CXR 1 view on site to rule out concerns-potential aspiration?  -Start zofran 4mg every 6 hours PRN  -Monitor respiratory status  -Monitor for fevers or changes in respiratory status  -BMP, CBC, vitamin B12 and vitamin D due 10/18/24    Narrative & Impression   EXAM: XR CHEST PORT 1 VIEW  LOCATION: [facility]  DATE: 10/15/2024     INDICATION:  Chest congestion  COMPARISON: None.                                                                      IMPRESSION: Negative chest.        (D62) ABLA (acute blood loss anemia)  Comment: Chronic. Baseline hgb~ 10  Plan:   -Monitor for bleeding risks  -Monitor for falls  -Continue ferrous sulfate 3x week dosing for now.   -BMP, CBC, vitamin B12 and vitamin D due 10/18/24     Electronically signed by:  Dr. Tanya Washington DNP, APRN, FNP-C, WCS-C, EDS-C     Provider reviewed records from facility, and interpreted most recent imaging/lab work, and vital signs.   Acute and chronic conditions managed by writer. Have been reviewed during today's exam.                Sincerely,        Tanya Washington, JENY CNP

## 2024-10-17 ENCOUNTER — LAB REQUISITION (OUTPATIENT)
Dept: LAB | Facility: CLINIC | Age: 84
End: 2024-10-17
Payer: MEDICARE

## 2024-10-17 DIAGNOSIS — E46 UNSPECIFIED PROTEIN-CALORIE MALNUTRITION (H): ICD-10-CM

## 2024-10-17 DIAGNOSIS — D64.9 ANEMIA, UNSPECIFIED: ICD-10-CM

## 2024-10-17 DIAGNOSIS — I10 ESSENTIAL (PRIMARY) HYPERTENSION: ICD-10-CM

## 2024-10-17 DIAGNOSIS — N18.30 CHRONIC KIDNEY DISEASE, STAGE 3 UNSPECIFIED (H): ICD-10-CM

## 2024-10-18 LAB
ANION GAP SERPL CALCULATED.3IONS-SCNC: 8 MMOL/L (ref 7–15)
BASOPHILS # BLD AUTO: 0 10E3/UL (ref 0–0.2)
BASOPHILS NFR BLD AUTO: 0 %
BUN SERPL-MCNC: 20 MG/DL (ref 8–23)
CALCIUM SERPL-MCNC: 9 MG/DL (ref 8.8–10.4)
CHLORIDE SERPL-SCNC: 107 MMOL/L (ref 98–107)
CREAT SERPL-MCNC: 0.96 MG/DL (ref 0.67–1.17)
EGFRCR SERPLBLD CKD-EPI 2021: 78 ML/MIN/1.73M2
EOSINOPHIL # BLD AUTO: 0.2 10E3/UL (ref 0–0.7)
EOSINOPHIL NFR BLD AUTO: 2 %
ERYTHROCYTE [DISTWIDTH] IN BLOOD BY AUTOMATED COUNT: 11.9 % (ref 10–15)
GLUCOSE SERPL-MCNC: 87 MG/DL (ref 70–99)
HCO3 SERPL-SCNC: 26 MMOL/L (ref 22–29)
HCT VFR BLD AUTO: 38.1 % (ref 40–53)
HGB BLD-MCNC: 11.9 G/DL (ref 13.3–17.7)
IMM GRANULOCYTES # BLD: 0 10E3/UL
IMM GRANULOCYTES NFR BLD: 0 %
LYMPHOCYTES # BLD AUTO: 2.1 10E3/UL (ref 0.8–5.3)
LYMPHOCYTES NFR BLD AUTO: 31 %
MCH RBC QN AUTO: 33 PG (ref 26.5–33)
MCHC RBC AUTO-ENTMCNC: 31.2 G/DL (ref 31.5–36.5)
MCV RBC AUTO: 106 FL (ref 78–100)
MONOCYTES # BLD AUTO: 0.5 10E3/UL (ref 0–1.3)
MONOCYTES NFR BLD AUTO: 7 %
NEUTROPHILS # BLD AUTO: 4 10E3/UL (ref 1.6–8.3)
NEUTROPHILS NFR BLD AUTO: 60 %
NRBC # BLD AUTO: 0 10E3/UL
NRBC BLD AUTO-RTO: 0 /100
PLATELET # BLD AUTO: 182 10E3/UL (ref 150–450)
POTASSIUM SERPL-SCNC: 4.1 MMOL/L (ref 3.4–5.3)
RBC # BLD AUTO: 3.61 10E6/UL (ref 4.4–5.9)
SODIUM SERPL-SCNC: 141 MMOL/L (ref 135–145)
VIT B12 SERPL-MCNC: 344 PG/ML (ref 232–1245)
VIT D+METAB SERPL-MCNC: 33 NG/ML (ref 20–50)
WBC # BLD AUTO: 6.7 10E3/UL (ref 4–11)

## 2024-10-18 PROCEDURE — 82607 VITAMIN B-12: CPT | Mod: ORL | Performed by: NURSE PRACTITIONER

## 2024-10-18 PROCEDURE — 85025 COMPLETE CBC W/AUTO DIFF WBC: CPT | Mod: ORL | Performed by: NURSE PRACTITIONER

## 2024-10-18 PROCEDURE — 36415 COLL VENOUS BLD VENIPUNCTURE: CPT | Mod: ORL | Performed by: NURSE PRACTITIONER

## 2024-10-18 PROCEDURE — 82306 VITAMIN D 25 HYDROXY: CPT | Mod: ORL | Performed by: NURSE PRACTITIONER

## 2024-10-18 PROCEDURE — 80048 BASIC METABOLIC PNL TOTAL CA: CPT | Mod: ORL | Performed by: NURSE PRACTITIONER

## 2024-11-12 ENCOUNTER — TELEPHONE (OUTPATIENT)
Dept: GERIATRICS | Facility: CLINIC | Age: 84
End: 2024-11-12
Payer: MEDICARE

## 2024-11-12 DIAGNOSIS — U07.1 INFECTION DUE TO 2019 NOVEL CORONAVIRUS: Primary | ICD-10-CM

## 2024-11-12 RX ORDER — GUAIFENESIN 200 MG/10ML
200 LIQUID ORAL EVERY 4 HOURS PRN
Qty: 473 ML | Refills: 0 | Status: SHIPPED | OUTPATIENT
Start: 2024-11-12 | End: 2024-12-12

## 2024-11-12 NOTE — TELEPHONE ENCOUNTER
[facility] TELEPHONE ENCOUNTER    Chief Complaint   Patient presents with    Covid Concern       Yue Wang is a 84 year old  (1940),Nurse called today to report: Found to be covid positive upon facility outbreak testing. Minimal symptoms of mild cough and rhinorrhea reported    ASSESSMENT/PLAN    Paxlovid BID x 5 days. Normal renal function  Robitussin PRN x 30 days    Electronically signed by:   JENY Hassan CNP

## 2024-12-04 ENCOUNTER — NURSING HOME VISIT (OUTPATIENT)
Dept: GERIATRICS | Facility: CLINIC | Age: 84
End: 2024-12-04

## 2024-12-04 VITALS
OXYGEN SATURATION: 96 % | DIASTOLIC BLOOD PRESSURE: 69 MMHG | TEMPERATURE: 96.5 F | HEART RATE: 69 BPM | WEIGHT: 129.4 LBS | BODY MASS INDEX: 20.79 KG/M2 | HEIGHT: 66 IN | SYSTOLIC BLOOD PRESSURE: 122 MMHG | RESPIRATION RATE: 18 BRPM

## 2024-12-04 DIAGNOSIS — Z86.16 PERSONAL HISTORY OF COVID-19: ICD-10-CM

## 2024-12-04 DIAGNOSIS — R13.10 DYSPHAGIA, UNSPECIFIED TYPE: ICD-10-CM

## 2024-12-04 DIAGNOSIS — D64.9 ANEMIA, UNSPECIFIED TYPE: ICD-10-CM

## 2024-12-04 DIAGNOSIS — F01.50 VASCULAR DEMENTIA WITHOUT BEHAVIORAL DISTURBANCE (H): Primary | ICD-10-CM

## 2024-12-04 NOTE — PROGRESS NOTES
"[facility]  PHYSICIAN NOTE    Chief Complaint   Patient presents with    group home Regulatory       HPI:    Yue Wang is a 84 year old  (1940), who is being seen today for a federally mandated E/M visit at [facility]. He resides on memory care unit; [facility] 4/30 in summer 2023. He has h/o dementia with CVA, HTN, past alcohol use, as well as R hip fracture in June 2023 s/p ORIF. Due to dysphagia, he is on a pureed diet with honey thickened liquids and needs help with feeding from staff.     Recent vitals: Afebrile, -140/50-80 with HR 60-80 and weight stable upper 120s#. Had COVID infection amid facility outbreak a month ago and had Paxlovid therapy; ultimately remained stable.     He is seen today after a music group event on the unit; was so engaged in this. I spoke to him separately afterwards. He smiled brightly and said he was \"very happy\". Has often been a man of few words since I've known him; didn't have any specific questions or concerns; continued to smile brightly at me. No acute nursing concerns either.    Past Medical History:   Diagnosis Date    Constipation     Dementia (H)     Depression     Dysphagia     Hip fracture (H) 06/25/2023    right    History of CVA (cerebrovascular accident)         CODE STATUS: DNR/I    ALLERGIES: Chlorpromazine; Fluphenazine; and Tuberculin, ppd    MEDICATIONS: Reviewed and updated in [facility] according to facility MAR  Current Outpatient Medications   Medication Sig Dispense Refill    acetaminophen (TYLENOL) 500 MG tablet Take 1,000 mg by mouth 2 times daily      ammonium lactate (AMLACTIN) 12 % external cream Apply topically daily as needed for dry skin (on heels)      aspirin 81 MG EC tablet Take 81 mg by mouth daily      bisacodyl (DULCOLAX) 10 MG suppository Place 10 mg rectally daily as needed for constipation      guaiFENesin (ROBITUSSIN) 20 mg/mL liquid Take 10 mLs (200 mg) by mouth every 4 hours as needed for cough. 473 mL 0 " "   ondansetron (ZOFRAN) 4 MG tablet Take 1 tablet (4 mg) by mouth every 6 hours as needed for nausea. 30 tablet 3    senna-docusate (SENOKOT-S/PERICOLACE) 8.6-50 MG tablet Take 1 tablet by mouth 2 times daily as needed for constipation      senna-docusate (SENOKOT-S/PERICOLACE) 8.6-50 MG tablet Take 1 tablet by mouth 2 times daily      sertraline (ZOLOFT) 50 MG tablet Take 50 mg by mouth daily         ROS:  Unobtainable secondary to cognitive impairment but does say he is \"very happy\"    Exam:  /69   Pulse 69   Temp (!) 96.5  F (35.8  C)   Resp 18   Ht 1.676 m (5' 6\")   Wt 58.7 kg (129 lb 6.4 oz)   SpO2 96%   BMI 20.89 kg/m    Alert, sitting up in WC well groomed, good hygiene, frail elderly gentleman  Moist oral mucosa  Heart tones regular  Breathing non-labored  Bright smile  No edema    Lab/Diagnostic Data:    Most Recent 3 CBC's:  Recent Labs   Lab Test 10/18/24  1112 07/19/24  1108 05/01/24  1026   WBC 6.7 7.1 6.6   HGB 11.9* 11.1* 11.4*   * 103* 102*    203 219     Most Recent 3 BMP's:  Recent Labs   Lab Test 10/18/24  1112 07/19/24  1108 10/09/23  1122    141 140   POTASSIUM 4.1 4.1 4.0   CHLORIDE 107 106 105   CO2 26 27 25   BUN 20.0 22.0 20.8   CR 0.96 0.90 0.81   ANIONGAP 8 8 10   SUNIL 9.0 9.1 9.4   GLC 87 88 104*     Most Recent 2 LFT's:  Recent Labs   Lab Test 07/19/24  1108 10/09/23  1122   AST 22 23   ALT 22 19   ALKPHOS 66 68   BILITOTAL 0.3 0.3     Most Recent TSH and T4:  Recent Labs   Lab Test 10/21/22  0750   TSH 3.19     Oct 2024: Vitamin B12 of 344, Vitamin D 33      ASSESSMENT/PLAN:  Vascular dementia without behavioral disturbance (H)  Appreciate care and support of LTC team  Continues on low dose stable Sertraline 50 mg dose to help maintain stability/comfort with advancing dementia - glad he is happy/content - no need to attempt GDR    Dysphagia, unspecified type  Regular diet, Pureed texture, Honey consistency  Weight stable now for several months though " expect future decline with advancing dementia    Anemia, unspecified type  Had ABLA after hip fracture a few years ago  Iron counts had improved from Dec 2023 to May 2024 as well as Hgb; no longer on iron supplement as of a few months ago  Follow CBC periodically as clinically appropriate - perhaps a couple times per year    Personal history of COVID-19  Completed Paxlovid course last month; fared well          Electronically signed by:  Roxann Vann,

## 2024-12-04 NOTE — LETTER
" 12/4/2024      Yue Wang  C/o Amanda Wang  [address]   [facility] [id number]        [facility]  PHYSICIAN NOTE    Chief Complaint   Patient presents with     senior care Regulatory       HPI:    Yue Wang is a 84 year old  (1940), who is being seen today for a federally mandated E/M visit at [facility]. He resides on memory care unit; [facility] 4/30 in summer 2023. He has h/o dementia with CVA, HTN, past alcohol use, as well as R hip fracture in June 2023 s/p ORIF. Due to dysphagia, he is on a pureed diet with honey thickened liquids and needs help with feeding from staff.     Recent vitals: Afebrile, -140/50-80 with HR 60-80 and weight stable upper 120s#. Had COVID infection amid facility outbreak a month ago and had Paxlovid therapy; ultimately remained stable.     He is seen today after a music group event on the unit; was so engaged in this. I spoke to him separately afterwards. He smiled brightly and said he was \"very happy\". Has often been a man of few words since I've known him; didn't have any specific questions or concerns; continued to smile brightly at me. No acute nursing concerns either.    Past Medical History:   Diagnosis Date     Constipation      Dementia (H)      Depression      Dysphagia      Hip fracture (H) 06/25/2023    right     History of CVA (cerebrovascular accident)         CODE STATUS: DNR/I    ALLERGIES: Chlorpromazine; Fluphenazine; and Tuberculin, ppd    MEDICATIONS: Reviewed and updated in [facility] according to facility MAR  Current Outpatient Medications   Medication Sig Dispense Refill     acetaminophen (TYLENOL) 500 MG tablet Take 1,000 mg by mouth 2 times daily       ammonium lactate (AMLACTIN) 12 % external cream Apply topically daily as needed for dry skin (on heels)       aspirin 81 MG EC tablet Take 81 mg by mouth daily       bisacodyl (DULCOLAX) 10 MG suppository Place 10 mg rectally daily as needed for constipation       " "guaiFENesin (ROBITUSSIN) 20 mg/mL liquid Take 10 mLs (200 mg) by mouth every 4 hours as needed for cough. 473 mL 0     ondansetron (ZOFRAN) 4 MG tablet Take 1 tablet (4 mg) by mouth every 6 hours as needed for nausea. 30 tablet 3     senna-docusate (SENOKOT-S/PERICOLACE) 8.6-50 MG tablet Take 1 tablet by mouth 2 times daily as needed for constipation       senna-docusate (SENOKOT-S/PERICOLACE) 8.6-50 MG tablet Take 1 tablet by mouth 2 times daily       sertraline (ZOLOFT) 50 MG tablet Take 50 mg by mouth daily         ROS:  Unobtainable secondary to cognitive impairment but does say he is \"very happy\"    Exam:  /69   Pulse 69   Temp (!) 96.5  F (35.8  C)   Resp 18   Ht 1.676 m (5' 6\")   Wt 58.7 kg (129 lb 6.4 oz)   SpO2 96%   BMI 20.89 kg/m    Alert, sitting up in WC well groomed, good hygiene, frail elderly gentleman  Moist oral mucosa  Heart tones regular  Breathing non-labored  Bright smile  No edema    Lab/Diagnostic Data:    Most Recent 3 CBC's:  Recent Labs   Lab Test 10/18/24  1112 07/19/24  1108 05/01/24  1026   WBC 6.7 7.1 6.6   HGB 11.9* 11.1* 11.4*   * 103* 102*    203 219     Most Recent 3 BMP's:  Recent Labs   Lab Test 10/18/24  1112 07/19/24  1108 10/09/23  1122    141 140   POTASSIUM 4.1 4.1 4.0   CHLORIDE 107 106 105   CO2 26 27 25   BUN 20.0 22.0 20.8   CR 0.96 0.90 0.81   ANIONGAP 8 8 10   SUINL 9.0 9.1 9.4   GLC 87 88 104*     Most Recent 2 LFT's:  Recent Labs   Lab Test 07/19/24  1108 10/09/23  1122   AST 22 23   ALT 22 19   ALKPHOS 66 68   BILITOTAL 0.3 0.3     Most Recent TSH and T4:  Recent Labs   Lab Test 10/21/22  0750   TSH 3.19     Oct 2024: Vitamin B12 of 344, Vitamin D 33      ASSESSMENT/PLAN:  Vascular dementia without behavioral disturbance (H)  Appreciate care and support of LTC team  Continues on low dose stable Sertraline 50 mg dose to help maintain stability/comfort with advancing dementia - glad he is happy/content - no need to attempt " GDR    Dysphagia, unspecified type  Regular diet, Pureed texture, Honey consistency  Weight stable now for several months though expect future decline with advancing dementia    Anemia, unspecified type  Had ABLA after hip fracture a few years ago  Iron counts had improved from Dec 2023 to May 2024 as well as Hgb; no longer on iron supplement as of a few months ago  Follow CBC periodically as clinically appropriate - perhaps a couple times per year    Personal history of COVID-19  Completed Paxlovid course last month; fared well          Electronically signed by:  Roxann Vann DO      Sincerely,        Roxann Vann DO

## 2025-02-19 ENCOUNTER — NURSING HOME VISIT (OUTPATIENT)
Dept: GERIATRICS | Facility: CLINIC | Age: 85
End: 2025-02-19
Payer: MEDICARE

## 2025-02-19 VITALS
HEART RATE: 64 BPM | OXYGEN SATURATION: 97 % | RESPIRATION RATE: 16 BRPM | WEIGHT: 135.4 LBS | TEMPERATURE: 97.4 F | SYSTOLIC BLOOD PRESSURE: 140 MMHG | DIASTOLIC BLOOD PRESSURE: 70 MMHG | BODY MASS INDEX: 21.76 KG/M2 | HEIGHT: 66 IN

## 2025-02-19 DIAGNOSIS — F10.21 ALCOHOL DEPENDENCE IN REMISSION (H): ICD-10-CM
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
## 2025-02-19 DIAGNOSIS — F01.C0 SEVERE VASCULAR DEMENTIA WITHOUT BEHAVIORAL DISTURBANCE, PSYCHOTIC DISTURBANCE, MOOD DISTURBANCE, OR ANXIETY (H): ICD-10-CM

## 2025-02-19 DIAGNOSIS — D64.9 ANEMIA, UNSPECIFIED TYPE: ICD-10-CM

## 2025-02-19 PROCEDURE — 99309 SBSQ NF CARE MODERATE MDM 30: CPT | Performed by: INTERNAL MEDICINE

## 2025-02-19 NOTE — PROGRESS NOTES
"[facility]  PHYSICIAN NOTE    Chief Complaint   Patient presents with    USP Regulatory       HPI:    Yue Wang is a 84 year old  (1940), who is being seen today for a federally mandated E/M visit at [facility]. He resides on memory care unit; [facility] 4/30 in summer 2023. He has h/o dementia with CVA, HTN, past alcohol use, as well as R hip fracture in June 2023 s/p ORIF. Due to dysphagia, he is on a pureed diet with honey thickened liquids and needs help with feeding from staff.     Recent vitals: Afebrile, -140/60-80, HR 60-80 and weight stable in 130s#.     He did have recent fall on 2/17/25 found lying on his back on the floor in his room parallel to the bed at 8:15 PM. Had said he was trying to get out of bed. Had been appropriately toileted 30 minutes prior to the fall. No injuries. Per follow up progress note on site, \"Review in fall committee meeting for appropriate safety interventions. Resident was found on the floor beside bed without noted injury. Res reported that he was trying to get OOB. He is non ambulatory and requires Ax[facility] staff with use of mechanical lift fortransfers. It was determined that the fall was primarily due to the resident's misjudgment. At baseline, the resident has a lack of awareness with impaired decision-making capability. Unable to retain instructions to use call-light for help getting OOB. Goal to minimize injury: staff to keep bed in low position when not giving cares. Not reportable, abuse/neglect is not suspected. Care plan was updated.\"    Yue is seen today out in the common area where he was passively watching an animal TV show.  Smiled with the visit.  Says he does not recall the fall from earlier in the week.  Denies any pain.  Says he is \"fine thank you\".  Was drooling a little bit during the visit and like to my offer for a towel to dab this up and able to do so independently.  Denied any concerns.    Past Medical " "History:   Diagnosis Date    Constipation     Dementia (H)     Depression     Dysphagia     Hip fracture (H) 06/25/2023    right    History of CVA (cerebrovascular accident)         CODE STATUS: DNR/I    ALLERGIES: Chlorpromazine; Fluphenazine; and Tuberculin, ppd    MEDICATIONS: Reviewed and updated in [facility] according to facility MAR  Current Outpatient Medications   Medication Sig Dispense Refill    acetaminophen (TYLENOL) 500 MG tablet Take 1,000 mg by mouth 2 times daily      ammonium lactate (AMLACTIN) 12 % external cream Apply topically daily as needed for dry skin (on heels)      aspirin 81 MG EC tablet Take 81 mg by mouth daily      bisacodyl (DULCOLAX) 10 MG suppository Place 10 mg rectally daily as needed for constipation      ondansetron (ZOFRAN) 4 MG tablet Take 1 tablet (4 mg) by mouth every 6 hours as needed for nausea. 30 tablet 3    senna-docusate (SENOKOT-S/PERICOLACE) 8.6-50 MG tablet Take 1 tablet by mouth 2 times daily as needed for constipation      senna-docusate (SENOKOT-S/PERICOLACE) 8.6-50 MG tablet Take 1 tablet by mouth 2 times daily      sertraline (ZOLOFT) 50 MG tablet Take 50 mg by mouth daily         ROS:  Limited secondary to cognitive impairment but today pt reports as above in HPI    Exam:  BP (!) 140/70   Pulse 64   Temp 97.4  F (36.3  C)   Resp 16   Ht 1.676 m (5' 6\")   Wt 61.4 kg (135 lb 6.4 oz)   SpO2 97%   BMI 21.85 kg/m    Alert, casually dressed, well-groomed, sitting up in a wheelchair in no acute distress  Forward flexure of spine and neck with slight drooling noted that he is able to dab up on his own  Breathing nonlabored on room air, no cough  No lower extremity edema  Bright smile and seems to enjoy the visit    Lab/Diagnostic Data:    Most Recent 3 CBC's:  Recent Labs   Lab Test 10/18/24  1112 07/19/24  1108 05/01/24  1026   WBC 6.7 7.1 6.6   HGB 11.9* 11.1* 11.4*   * 103* 102*    203 219     Most Recent 3 BMP's:  Recent Labs   Lab Test " 10/18/24  1112 07/19/24  1108 10/09/23  1122    141 140   POTASSIUM 4.1 4.1 4.0   CHLORIDE 107 106 105   CO2 26 27 25   BUN 20.0 22.0 20.8   CR 0.96 0.90 0.81   ANIONGAP 8 8 10   SUNIL 9.0 9.1 9.4   GLC 87 88 104*     Most Recent 2 LFT's:  Recent Labs   Lab Test 07/19/24  1108 10/09/23  1122   AST 22 23   ALT 22 19   ALKPHOS 66 68   BILITOTAL 0.3 0.3     Most Recent TSH and T4:  Recent Labs   Lab Test 10/21/22  0750   TSH 3.19     Oct 2024: Vitamin B12 of 344, Vitamin D 33     ASSESSMENT/PLAN:  Fall, subsequent encounter  Thankfully no injuries from the fall  Continue fall reduction practices which are in place at the facility  Difficult to fully prevent falls due to his dementia    Dementia  Benefiting from the support and care of long-term care staff  Seems to engage in visits nicely  Continue sertraline for stability        Electronically signed by:  Roxann Vann DO

## 2025-02-19 NOTE — LETTER
2/19/2025      Yue PAN Gadsden  C/o Amanda Gadsden  [address]   [facility] [address]        No notes on file      Sincerely,        Roxann Vann, DO    Electronically signed   "to dab this up and able to do so independently.  Denied any concerns.    Past Medical History:   Diagnosis Date     Constipation      Dementia (H)      Depression      Dysphagia      Hip fracture (H) 06/25/2023    right     History of CVA (cerebrovascular accident)         CODE STATUS: DNR/I    ALLERGIES: Chlorpromazine; Fluphenazine; and Tuberculin, ppd    MEDICATIONS: Reviewed and updated in [facility] according to facility MAR  Current Outpatient Medications   Medication Sig Dispense Refill     acetaminophen (TYLENOL) 500 MG tablet Take 1,000 mg by mouth 2 times daily       ammonium lactate (AMLACTIN) 12 % external cream Apply topically daily as needed for dry skin (on heels)       aspirin 81 MG EC tablet Take 81 mg by mouth daily       bisacodyl (DULCOLAX) 10 MG suppository Place 10 mg rectally daily as needed for constipation       ondansetron (ZOFRAN) 4 MG tablet Take 1 tablet (4 mg) by mouth every 6 hours as needed for nausea. 30 tablet 3     senna-docusate (SENOKOT-S/PERICOLACE) 8.6-50 MG tablet Take 1 tablet by mouth 2 times daily as needed for constipation       senna-docusate (SENOKOT-S/PERICOLACE) 8.6-50 MG tablet Take 1 tablet by mouth 2 times daily       sertraline (ZOLOFT) 50 MG tablet Take 50 mg by mouth daily         ROS:  Limited secondary to cognitive impairment but today pt reports as above in HPI    Exam:  BP (!) 140/70   Pulse 64   Temp 97.4  F (36.3  C)   Resp 16   Ht 1.676 m (5' 6\")   Wt 61.4 kg (135 lb 6.4 oz)   SpO2 97%   BMI 21.85 kg/m    Alert, casually dressed, well-groomed, sitting up in a wheelchair in no acute distress  Forward flexure of spine and neck with slight drooling noted that he is able to dab up on his own  Breathing nonlabored on room air, no cough  No lower extremity edema  Bright smile and seems to enjoy the visit    Lab/Diagnostic Data:    Most Recent 3 CBC's:  Recent Labs   Lab Test 10/18/24  1112 07/19/24  1108 05/01/24  1026   WBC 6.7 7.1 6.6   HGB 11.9* 11.1* 11.4* "   * 103* 102*    203 219     Most Recent 3 BMP's:  Recent Labs   Lab Test 10/18/24  1112 07/19/24  1108 10/09/23  1122    141 140   POTASSIUM 4.1 4.1 4.0   CHLORIDE 107 106 105   CO2 26 27 25   BUN 20.0 22.0 20.8   CR 0.96 0.90 0.81   ANIONGAP 8 8 10   SUNIL 9.0 9.1 9.4   GLC 87 88 104*     Most Recent 2 LFT's:  Recent Labs   Lab Test 07/19/24  1108 10/09/23  1122   AST 22 23   ALT 22 19   ALKPHOS 66 68   BILITOTAL 0.3 0.3     Most Recent TSH and T4:  Recent Labs   Lab Test 10/21/22  0750   TSH 3.19     Oct 2024: Vitamin B12 of 344, Vitamin D 33     ASSESSMENT/PLAN:  Fall, subsequent encounter  Thankfully no injuries from the fall  Continue fall reduction practices which are in place at the facility  Difficult to fully prevent falls due to his dementia    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, psychotic disturbance, mood disturbance, or anxiety (H)  Benefiting from the support and care of long-term care staff  Seems to engage in visits nicely  Continue sertraline for stability as keeps mood in check    Alcohol dependence in remission  No access to alcohol in LTC  No signs of missing it or withdrawal  Could have contributed to his presenting cognitive impairment in addition to known vascular changes    Anemia, unspecified type  Had ABLA after hip fracture a few years ago  Iron counts had improved from Dec 2023 to May 2024 as well as Hgb; no longer on iron supplement as of late 2024  Follow CBC periodically as clinically appropriate - perhaps a couple times per year (so maybe in the spring)  Does have mild macrocytosis with normal B12 though low-normal; could recheck B12 with CBC as not on B12 supplement  Goals of care comfort based so minimize new meds/supplements if not perceived to be helpful    Electronically signed by:  Roxann Vann, DO      Sincerely,        Roxann Vann, DO    Electronically signed

## 2025-03-04 ENCOUNTER — PATIENT OUTREACH (OUTPATIENT)
Dept: GERIATRIC MEDICINE | Facility: CLINIC | Age: 85
End: 2025-03-04
Payer: MEDICARE

## 2025-03-04 NOTE — PROGRESS NOTES
[facility] Care Coordination Contact    Member became effective with [facility] on 3/1/2025 with [employer].  Previous Health Plan: MA/Fee For Service  Previous Care System:  N/A  Previous care coordinators name and number: Jordyn Fowler  [place] Type: N/A  UTF received: No UTF to request  Mailed welcome letter and Mailed [employer] Leave Behind document  Address/Phone discrepancy: N/A    MMIS: 05 DOC CHG 10/8/21    Negra Guerrier  Case Management Specialist   [facility]  [phone]

## 2025-03-04 NOTE — LETTER
March 4, 2025    Important [employer] Information    YUE PAN HEARD  C/O GILA HEARD  [address]   [facility] [address]  A Partner in Your Care  Dear Yue,  Thank you for choosing [employer] for your health plan coverage! I am excited to welcome you as a member of [employer] .  My name is XANDER Prather, [facility] and I will be working with you as your Care Coordinator. [facility] partners with [employer] to provide members with Care Coordination services.  As your Care Coordinator, I can:  Work with you to create a Care Plan to keep you healthy and safe  Help you make appointments to see health care providers   Support you and your family in making health care decisions  Find community services that may interest you  Identify health benefits you are eligible for  What happens next?  To get started, I will call you. I ll ask you a few questions about your health and schedule a time to meet. You will have a chance to ask me questions, too.  Questions?  Call me at [phone] Monday-Friday between 8am and 5pm. TTY: [id number]. I look forward to speaking with you soon.  Sincerely,    XANDER Prather, [facility]    E-mail: [email or web address]  Phone: [phone]      Aliyah [facility]    cc: member records                                                                                        _

## 2025-03-20 ENCOUNTER — TELEPHONE (OUTPATIENT)
Dept: GERIATRICS | Facility: CLINIC | Age: 85
End: 2025-03-20
Payer: COMMERCIAL

## 2025-03-20 NOTE — TELEPHONE ENCOUNTER
[facility] Geriatrics Triage Nurse Telephone Encounter    Provider: Tanya Washington DNP, APRN  Facility: [facility] Facility Type:  LT    Caller: Clementine  Call Back Number: [phone]    Allergies:    Allergies   Allergen Reactions    Chlorpromazine     Fluphenazine     Tuberculin, Ppd         Reason for call: Nurse is calling to report that patient always has drooling, but staff is noting recently that patient has excessive secretions after meals and will also have some gurgling noted.  No coughing noted.  VS are stable.  Lung sounds are CTA.      Verbal Order/Direction given by Provider: Atropine 1% ophthalmic solution---2 drops sublingually QID PRN for excessive secretions.  Speech therapy to eval and treat.      Provider giving Order:  HEATHER Henderson    Verbal Order given to: Clementine Waite RN

## 2025-03-21 ENCOUNTER — PATIENT OUTREACH (OUTPATIENT)
Dept: GERIATRIC MEDICINE | Facility: CLINIC | Age: 85
End: 2025-03-21
Payer: COMMERCIAL

## 2025-03-21 NOTE — Clinical Note
Bryn Martínez, my note on Yue.  I know we touched base about him and the swallowing/drooling and addition of atropine drops.  If there is anything else I should know let me know, thanks!  XANDER Prather, [facility] [phone]

## 2025-03-27 ENCOUNTER — PATIENT OUTREACH (OUTPATIENT)
Dept: GERIATRIC MEDICINE | Facility: CLINIC | Age: 85
End: 2025-03-27
Payer: COMMERCIAL

## 2025-03-27 NOTE — PROGRESS NOTES
[facility] Care Coordination Contact    [facility] Institutional Initial Assessment     Institutional Assessment for Health Risk Assessment with Yue PAN Felipe completed on March 21, 2025 at [facility]    Type of residence:: Nursing home  Current living arrangement:: I live in a nursing home     Assessment completed with:: Patient, Care Team Member    Mental/Behavioral Health   Depression Screening:      Mental health DX:: Yes   Mental health DX how managed:: Medication    Falls Assessment:   Fallen 2 or more times in the past year?: Yes   Any fall with injury in the past year?: No    ADL/IADL Dependencies:   Dependent ADLs:: Bathing, Dressing, Grooming, Toileting, Wheelchair-with assist, Transfers, Incontinence, Positioning  Dependent IADLs:: Cleaning, Transportation, Incontinence, Cooking, Laundry, Shopping, Meal Preparation, Medication Management, Money Management    Care Plan & Recommendations: CC met with Yue in the dining room on this date.  He was minimally responsive, did not have any concerns.  Per ANGELINE Salinas and Clementine Rowley, Clinical Nurse Manager, he has been having some trouble with swallowing and gurgling after eating so they have started atropine drops which seem to have been helping.  Discussed options/opportunities for transitions.    See Institutional Care Plan for detailed assessment information.    Obtained a copy of the facility care plan and MDS from facility electronic records. Requested of senior care social worker to put this care coordinator on care conference attendee list.    Placed the Health Plan facility face sheet in the member's facility chart.    Follow-Up Plan: Member informed of future contact, plan to f/u with member with a 6 month assessment, attend 1 care conference annually, and will follow any hospitalizations or transitions. Care Coordinator contact information shared with member/family and facility, and encouraged to call this care coordinator with  any questions or concerns at any time.     [place] care continuum providers: Please see Snapshot and Care Management Flowsheets for Specific details of care plan.    This CC note routed to PCP, Tanya Washington.    XANDER Prather, [facility]  [phone]

## 2025-03-27 NOTE — PROGRESS NOTES
[facility] Care Coordination Contact    CC attended care conference for member on 3/27/25 at [facility].   Present at care conference this care coordinator, NH  (Rita Loya), and NH RN (Clementine Rowley).  Nursing Report: The atropine drops have helped Yue.  He has had some falls, but they have started lowering the bed as well.  Social Work Report: Per Rita, scored a 9 on BIMS and 7 on PHQ-9, which has been typical with him.     XANDER Prather, [facility]  [phone]

## 2025-04-14 ENCOUNTER — DOCUMENTATION ONLY (OUTPATIENT)
Dept: OTHER | Facility: CLINIC | Age: 85
End: 2025-04-14
Payer: COMMERCIAL

## 2025-04-14 VITALS
HEIGHT: 64 IN | TEMPERATURE: 98 F | OXYGEN SATURATION: 94 % | SYSTOLIC BLOOD PRESSURE: 132 MMHG | BODY MASS INDEX: 24.28 KG/M2 | DIASTOLIC BLOOD PRESSURE: 67 MMHG | HEART RATE: 77 BPM | WEIGHT: 142.2 LBS | RESPIRATION RATE: 15 BRPM

## 2025-04-15 ENCOUNTER — NURSING HOME VISIT (OUTPATIENT)
Dept: GERIATRICS | Facility: CLINIC | Age: 85
End: 2025-04-15
Payer: COMMERCIAL

## 2025-04-15 DIAGNOSIS — E55.9 VITAMIN D DEFICIENCY: ICD-10-CM

## 2025-04-15 DIAGNOSIS — Z87.81 HISTORY OF FRACTURE OF RIGHT HIP: ICD-10-CM

## 2025-04-15 DIAGNOSIS — R62.7 FAILURE TO THRIVE IN ADULT: ICD-10-CM

## 2025-04-15 DIAGNOSIS — M81.0 OSTEOPOROSIS, UNSPECIFIED OSTEOPOROSIS TYPE, UNSPECIFIED PATHOLOGICAL FRACTURE PRESENCE: ICD-10-CM

## 2025-04-15 DIAGNOSIS — R68.89 EXCESSIVE ORAL SECRETIONS: ICD-10-CM

## 2025-04-15 DIAGNOSIS — F01.C0 SEVERE VASCULAR DEMENTIA WITHOUT BEHAVIORAL DISTURBANCE, PSYCHOTIC DISTURBANCE, MOOD DISTURBANCE, OR ANXIETY (H): ICD-10-CM

## 2025-04-15 DIAGNOSIS — D62 ABLA (ACUTE BLOOD LOSS ANEMIA): Primary | ICD-10-CM

## 2025-04-15 DIAGNOSIS — G89.29 CHRONIC LOW BACK PAIN, UNSPECIFIED BACK PAIN LATERALITY, UNSPECIFIED WHETHER SCIATICA PRESENT: ICD-10-CM

## 2025-04-15 DIAGNOSIS — Z86.73 HISTORY OF CVA (CEREBROVASCULAR ACCIDENT): ICD-10-CM

## 2025-04-15 DIAGNOSIS — R13.10 DYSPHAGIA, UNSPECIFIED TYPE: ICD-10-CM

## 2025-04-15 DIAGNOSIS — F43.10 PTSD (POST-TRAUMATIC STRESS DISORDER): ICD-10-CM

## 2025-04-15 DIAGNOSIS — R11.2 NAUSEA AND VOMITING, UNSPECIFIED VOMITING TYPE: ICD-10-CM

## 2025-04-15 DIAGNOSIS — R09.89 PULMONARY CONGESTION: ICD-10-CM

## 2025-04-15 DIAGNOSIS — M54.50 CHRONIC LOW BACK PAIN, UNSPECIFIED BACK PAIN LATERALITY, UNSPECIFIED WHETHER SCIATICA PRESENT: ICD-10-CM

## 2025-04-15 DIAGNOSIS — I10 HTN (HYPERTENSION), BENIGN: ICD-10-CM

## 2025-04-15 DIAGNOSIS — E78.5 HYPERLIPIDEMIA, UNSPECIFIED HYPERLIPIDEMIA TYPE: ICD-10-CM

## 2025-04-15 DIAGNOSIS — K59.00 CONSTIPATION, UNSPECIFIED CONSTIPATION TYPE: ICD-10-CM

## 2025-04-15 PROCEDURE — 99309 SBSQ NF CARE MODERATE MDM 30: CPT | Performed by: NURSE PRACTITIONER

## 2025-04-15 NOTE — LETTER
4/15/2025      Yue Wang  C/o Amanda Wang  [address]   [facility] [address]        [facility] GERIATRICS  Chief Complaint   Patient presents with     [facility] [place]:  [id number]  Place of Service where encounter took place:  [facility] [[id number]]    HPI:    Yue Wang  is 84 year old (1940), who is being seen today for a federally mandated E/M visit. 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 He is alert. He does not appear to be in any respiratory distress. Noted some minimal oral secretions at this time. Now has orders for bedside suction after oral intake. No abdominal distress. Incontinent of B&B. Oral intake fair. No behaviors. Due to functional status and decline with risk of aspirations, would recommend hospice/comfort focus goals. Hospice consult given. Sleeping well. No evidence of pain.    BP Readings from Last 3 Encounters:   04/14/25 132/67   02/19/25 (!) 140/70   12/04/24 122/69     Wt Readings from Last 5 Encounters:   04/14/25 64.5 kg (142 lb 3.2 oz)   02/19/25 61.4 kg (135 lb 6.4 oz)    12/04/24 58.7 kg (129 lb 6.4 oz)   10/15/24 57.2 kg (126 lb)   08/14/24 54.6 kg (120 lb 6.4 oz)     ALLERGIES:Chlorpromazine; Fluphenazine; and Tuberculin, ppd  PAST MEDICAL HISTORY:   Past Medical History:   Diagnosis Date     Constipation      Dementia (H)      Depression      Dysphagia      Hip fracture (H) 06/25/2023    right     History of CVA (cerebrovascular accident)      PAST SURGICAL HISTORY:   has a past surgical history that includes Hip Fracture Surgery (Right, 06/25/2023).  FAMILY HISTORY: family history is not on file.  SOCIAL HISTORY:  reports that he has quit smoking. His smoking use included cigarettes. He has quit using smokeless tobacco. He reports that he does not currently use drugs after having used the following drugs: Cocaine.    MEDICATIONS:  MED REC REQUIRED  Post Medication Reconciliation Status:  Discharge medications reconciled and changed, see notes/orders         Review of your medicines            Accurate as of April 15, 2025  6:07 PM. If you have any questions, ask your nurse or doctor.                CONTINUE these medicines which have NOT CHANGED        Dose / Directions   acetaminophen 500 MG tablet  Commonly known as: TYLENOL      Dose: 1,000 mg  Take 1,000 mg by mouth 2 times daily  Refills: 0     ammonium lactate 12 % external cream  Commonly known as: AMLACTIN      Apply topically daily as needed for dry skin (on heels)  Refills: 0     aspirin 81 MG EC tablet      Dose: 81 mg  Take 81 mg by mouth daily  Refills: 0     bisacodyl 10 MG suppository  Commonly known as: DULCOLAX      Dose: 10 mg  Place 10 mg rectally daily as needed for constipation  Refills: 0     ondansetron 4 MG tablet  Commonly known as: ZOFRAN  Used for: Nausea and vomiting, unspecified vomiting type      Dose: 4 mg  Take 1 tablet (4 mg) by mouth every 6 hours as needed for nausea.  Quantity: 30 tablet  Refills: 3     * senna-docusate 8.6-50 MG tablet  Commonly known as: SENOKOT-S/PERICOLACE      Dose: 1  "tablet  Take 1 tablet by mouth 2 times daily as needed for constipation  Refills: 0     * senna-docusate 8.6-50 MG tablet  Commonly known as: SENOKOT-S/PERICOLACE      Dose: 1 tablet  Take 1 tablet by mouth 2 times daily  Refills: 0     sertraline 50 MG tablet  Commonly known as: ZOLOFT      Dose: 50 mg  Take 50 mg by mouth daily  Refills: 0           * This list has 2 medication(s) that are the same as other medications prescribed for you. Read the directions carefully, and ask your doctor or other care provider to review them with you.                 Case Management:  I have reviewed the care plan and MDS and do agree with the plan. Patient's desire to return to the community is not assessible due to cognitive impairment. Information reviewed:  Medications, vital signs, orders, and nursing notes.    ROS:  Unobtainable secondary to cognitive impairment.     Vitals:  /67   Pulse 77   Temp 98  F (36.7  C)   Resp 15   Ht 1.626 m (5' 4\")   Wt 64.5 kg (142 lb 3.2 oz)   SpO2 94%   BMI 24.41 kg/m    Body mass index is 24.41 kg/m .  Exam:  GENERAL APPEARANCE:  Alert, thin, cooperative  ENT:  Mouth and posterior oropharynx normal, moist mucous membranes, [place]  EYES:  EOM, conjunctivae, lids, pupils and irises normal  NECK:  No adenopathy,masses or thyromegaly  RESP:  respiratory effort and palpation of chest normal, lungs clear to auscultation , no respiratory distress  CV:  regular rate and rhythm, no murmur, rub, or gallop, peripheral edema 1+ in BLE  ABDOMEN:  normal bowel sounds, soft, nontender, no hepatosplenomegaly or other masses, no guarding or rebound  M/S:   [place] lift transfers. Wheelchair bound. Staff assistance for all ADLs, transfers and cares  SKIN:  Inspection of skin and subcutaneous tissue baseline, Palpation of skin and subcutaneous tissue baseline  NEURO:   Cranial nerves 2-12 are normal tested and grossly at patient's baseline, no purposeful movement in upper and lower extremities  PSYCH:  " insight and judgement impaired, memory impaired , affect and mood normal    Lab/Diagnostic data:   Labs done in SNF are in [facility]. Please refer to them using EPIC/Care Everywhere. and Recent labs in EPIC reviewed by me today.     ASSESSMENT/PLAN  (F43.10) PTSD (post-traumatic stress disorder)  (F01.C0) 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  (R62.7) Failure to thrive  Comment: Chronic. Progressive. Resides in memory care  Plan:   -Monitor for worsening s/symptoms of concerns  -Monitor mood and behaviors  -Monitor for worsening mobility, eating and sleeping patterns  -Continue sertraline 50mg daily--GDR in future if able.   -Hospice eval and treatment consult due to decline in condition  -Reduce polypharmacy where able     (Z86.73) History of CVA (cerebrovascular accident)  (I10) HTN (hypertension), benign  (E78.5) Hyperlipidemia, unspecified hyperlipidemia type  Comment: Chronic. Based on JNC-8 goals,  patients age of 82 year old, no presence of diabetes or CKD, and goals of care goal BP is <150/90 mm Hg. Patient is stable and continue without pharmacological invention with routine assessment..SBP trends around 130s-140s. Also mild R facial droop which correlates with old MRI in 2015   Plan:   -Monitor for worsening s/symptoms of concerns  -Continue daily asa 81mg daily as directed  -Monitor BP and HR as directed  -Hospice eval and treatment consult due to decline in condition  -Reduce polypharmacy where able          (Z87.81) History of fracture of right hip  (M81.0) Osteoporosis, unspecified osteoporosis type, unspecified pathological fracture presence  (M54.50,  G89.29) Chronic low back pain, unspecified back pain laterality, unspecified whether sciatica present  (E55.9) Vitamin D deficiency  Comment: Chronic. Fracture noted in June 2023.  Stable alignment of history of ORIF  Plan  -Monitor pain complaints  -Continue Tylenol 1000mg BID  -Monitor fall risks  -Hospice eval and treatment  consult due to decline in condition  -Reduce polypharmacy where able     (K59.00) Constipation, unspecified constipation type  Comment: Chronic. Stable.  Plan:   -Monitor BM patterns  -Bisacodyl supp PRN  -Continue senna S BID and BID PRN     (R11.2) N&V  (R09.89) Pulmonary congestion  (R13.10) Dysphagia, unspecified type  (R68.89) Excessive oral secretions  Comment: Acute on chronic. Worsening swallowing noted. Increased oral secretions.   Plan:  -zofran 4mg every 6 hours PRN  -Continue PRN atropine as directed  -Monitor respiratory status  -Monitor for fevers or changes in respiratory status  -Continue diet as directed: Regular diet, Pureed texture, Honey consistency  -Continue Bedside oral suction W/Oral Yankauer after all oral intake   -Monitor BP and HR as directed  -Hospice eval and treatment consult due to decline in condition  -Reduce polypharmacy where able     (D62) ABLA (acute blood loss anemia)  Comment: Chronic. Baseline hgb~ 10  Plan:   -Monitor for bleeding risks  -Monitor for falls  -Hospice eval and treatment consult due to decline in condition  -Reduce polypharmacy where able     Electronically signed by:  Dr. Tanya Washington DNP, APRN, FNP-C, WCS-C, EDS-C     Provider reviewed records from facility, and interpreted most recent imaging/lab work, and vital signs.   Acute and chronic conditions managed by writer. Have been reviewed during today's exam.           Sincerely,        Tanya Washington, JENY CNP    Electronically signed

## 2025-04-16 ENCOUNTER — PATIENT OUTREACH (OUTPATIENT)
Dept: GERIATRIC MEDICINE | Facility: CLINIC | Age: 85
End: 2025-04-16
Payer: COMMERCIAL

## 2025-04-16 NOTE — PROGRESS NOTES
[facility] Care Coordination Contact    Received after visit chart from care coordinator.  Completed following tasks: Mailed [employer] Leave Behind Letter and Mailed [employer] Post Visit letter to member/rep and NH facility    Negra Guerrier  Case Management Specialist   [facility]  [phone]

## 2025-04-16 NOTE — LETTER
April 16, 2025    Important [employer] Information    YUE CASILLAS  C/O GILA CASILLAS  [address]  [facility] [address]                                                                              Your Care Plan      Dear Yue,    I am your [employer] Care Coordinator and I visited you at [facility]  on 3/21/2025 to complete your [employer] Health Assessment.    [x] I reviewed your Facility Care Plan and assessment and all identified needs have goals present    [] I reviewed your Facility Care Plan and assessment and we identified these additional goal(s):                                                                                                                                                                                                                                               I will plan to follow up:  [] Once a month  [] Every 3 months   [x] Every 6 months  [] Other      Questions?  If you have any questions or want to discuss your health care needs, call me at [phone] Monday-Friday between 8am and 5pm. TTY: [phone].     Sincerely,    XANDER Prather, [facility]    E-mail: [email or web address]  Phone: [phone]      [facility]      cc: member record, Skilled Nursing Facility    [email or web address]  is an HMO D-SNP that contracts with both Medicare and the [place] Medical Assistance (Medicaid) program to provide benefits of both programs to enrollees. Enrollment in [employer] depends on contract renewal.  [id number]    2023 [employer]

## 2025-04-30 ENCOUNTER — DOCUMENTATION ONLY (OUTPATIENT)
Dept: GERIATRICS | Facility: CLINIC | Age: 85
End: 2025-04-30
Payer: COMMERCIAL

## 2025-04-30 NOTE — PROGRESS NOTES
Patient admitted to [facility] hospice services effective today.  Admitting diagnosis late effects due to CVA and associated condition of dementia.

## 2025-05-01 ENCOUNTER — MEDICAL CORRESPONDENCE (OUTPATIENT)
Dept: HEALTH INFORMATION MANAGEMENT | Facility: CLINIC | Age: 85
End: 2025-05-01
Payer: COMMERCIAL

## 2025-06-12 VITALS
HEART RATE: 100 BPM | RESPIRATION RATE: 18 BRPM | WEIGHT: 137.3 LBS | OXYGEN SATURATION: 94 % | SYSTOLIC BLOOD PRESSURE: 135 MMHG | TEMPERATURE: 97.9 F | DIASTOLIC BLOOD PRESSURE: 76 MMHG | HEIGHT: 64 IN | BODY MASS INDEX: 23.44 KG/M2

## 2025-06-13 ENCOUNTER — NURSING HOME VISIT (OUTPATIENT)
Dept: GERIATRICS | Facility: CLINIC | Age: 85
End: 2025-06-13
Payer: COMMERCIAL

## 2025-06-13 DIAGNOSIS — R13.10 DYSPHAGIA, UNSPECIFIED TYPE: ICD-10-CM

## 2025-06-13 DIAGNOSIS — F01.50 VASCULAR DEMENTIA WITHOUT BEHAVIORAL DISTURBANCE (H): ICD-10-CM

## 2025-06-13 DIAGNOSIS — R68.89 EXCESSIVE ORAL SECRETIONS: ICD-10-CM

## 2025-06-13 DIAGNOSIS — Z51.5 HOSPICE CARE PATIENT: Primary | ICD-10-CM

## 2025-06-13 DIAGNOSIS — Z86.73 HISTORY OF CVA (CEREBROVASCULAR ACCIDENT): ICD-10-CM

## 2025-06-13 PROCEDURE — 99309 SBSQ NF CARE MODERATE MDM 30: CPT | Performed by: INTERNAL MEDICINE

## 2025-06-13 RX ORDER — LORAZEPAM 2 MG/ML
0.5 CONCENTRATE ORAL EVERY 4 HOURS PRN
COMMUNITY

## 2025-06-13 RX ORDER — ACETAMINOPHEN 500 MG
1000 TABLET ORAL 3 TIMES DAILY PRN
COMMUNITY

## 2025-06-13 RX ORDER — HYOSCYAMINE SULFATE 0.125 MG
0.12 TABLET,DISINTEGRATING ORAL EVERY 4 HOURS PRN
COMMUNITY

## 2025-06-13 RX ORDER — MORPHINE SULFATE 20 MG/ML
5 SOLUTION ORAL EVERY 4 HOURS PRN
COMMUNITY

## 2025-06-13 NOTE — PROGRESS NOTES
"[facility]  PHYSICIAN NOTE    Chief Complaint   Patient presents with    half-way Regulatory       HPI:    Yue Wang is a 84 year old  (1940), who is being seen today for a federally mandated E/M visit at [facility]. He resides on memory care unit; [facility] 4/30 in summer 2023. He has h/o dementia with CVA, HTN, past alcohol use, as well as R hip fracture in June 2023 s/p ORIF and dysphagia.    In spring 2025, noted to have more secretions than usual especially after meals with some \"gurgling\" noted at times. No known aspiration but certainly at risk for that. Continued on modified diet. At baseline had on-going drooling s/p CVA. New orders for PRN Atropine, suction available after meals and hospice consultation placed per PCP. He subsequently signed onto [facility] early May 2025 with terminal diagnosis of h/o CVA and vascular dementia. Now has orders to spoon feed all liquids which are honey consistency, offer small bites and allow plenty of time to swallow before next bite at meals. Remains pureed texture for solids. I spoke with nursing staff and his primary contact is his sister Amanda who lives out of [place] and she consented for hospice over the phone.    Today, I saw Yue resting awake in bed just after staff assisted with lunch. He was quiet, tried to mumble some responses to my conversation/questions that seemed inconsistent. Said things like \"I don't know, yeah\". Not clearly in pain and in fact seemed calm/comfortable. Per RN, she feels he can reliably let them know with either a \"yes\" or \"no\" if he is in pain. Suspects he is tired out after lunch now which is why I'm having a bit of difficulty with getting consistent response from him. Note on review of MAR, only received 3 doses of PRN Morphine so far in the past couple weeks. There is a note from 6/10 of him \"observed making crying-like sounds\". Per progress note, staff repositioned him but he was still in " "pain so appropriate PRN Morphine and Lorazepam were administered which then led to him appearing more relaxed and was able to return to sleep. On 6/11 noted he had a 1:1 visit with TR in which they looked through a history of football picture book at dining room table. Per notes, \"Staff prompted discussion. Resident made eye contact and tracked book's contents visually but verbalized infrequently. Staff concluded visit after res fell asleep. Visit length was 15 minutes.\"      Past Medical History:   Diagnosis Date    Constipation     Dementia (H)     Depression     Dysphagia     Hip fracture (H) 06/25/2023    right    History of CVA (cerebrovascular accident)         CODE STATUS: DNR/I/H - [facility] since May 2025    ALLERGIES: Chlorpromazine; Fluphenazine; and Tuberculin, ppd    MEDICATIONS: Reviewed and updated in [facility] according to facility MAR  Current Outpatient Medications   Medication Sig Dispense Refill    acetaminophen (TYLENOL) 500 MG tablet Take 1,000 mg by mouth 3 times daily as needed for mild pain.      ammonium lactate (AMLACTIN) 12 % external cream Apply topically daily as needed for dry skin (on heels)      aspirin 81 MG EC tablet Take 81 mg by mouth daily      hyoscyamine 0.125 MG TBDP Take 0.125 mg by mouth every 4 hours as needed (secretions).      LORazepam (ATIVAN) 2 MG/ML (HIGH CONC) oral solution Take 0.5 mg by mouth every 4 hours as needed for agitation.      morphine sulfate (ROXANOL) 20 mg/mL (HIGH CONC) soln Take 5 mg by mouth every 4 hours as needed for shortness of breath or breakthrough pain.      ondansetron (ZOFRAN) 4 MG tablet Take 1 tablet (4 mg) by mouth every 6 hours as needed for nausea. 30 tablet 3    senna-docusate (SENOKOT-S/PERICOLACE) 8.6-50 MG tablet Take 1 tablet by mouth 2 times daily as needed for constipation      senna-docusate (SENOKOT-S/PERICOLACE) 8.6-50 MG tablet Take 1 tablet by mouth 2 times daily      sertraline (ZOLOFT) 50 MG tablet Take 50 mg by mouth " "daily         ROS:  Unobtainable secondary to cognitive impairment and aphasia    Exam:  /76   Pulse 100   Temp 97.9  F (36.6  C)   Resp 18   Ht 1.626 m (5' 4\")   Wt 62.3 kg (137 lb 4.8 oz)   SpO2 94%   BMI 23.57 kg/m    Awake but sleepy, resting comfortably in bed, good hygiene  Moist oral mucosa with some mild secretions noted but no gurgling  HRRR without mrg  Breathing non-labored on RA, no cough  Abdomen thin  Trace bilateral sock line edema  Seems calm/content  Hard to answer direct questions; mumbled dysarthric voice    Lab/Diagnostic Data:    Most Recent 3 CBC's:  Recent Labs   Lab Test 10/18/24  1112 07/19/24  1108 05/01/24  1026   WBC 6.7 7.1 6.6   HGB 11.9* 11.1* 11.4*   * 103* 102*    203 219     Most Recent 3 BMP's:  Recent Labs   Lab Test 10/18/24  1112 07/19/24  1108 10/09/23  1122    141 140   POTASSIUM 4.1 4.1 4.0   CHLORIDE 107 106 105   CO2 26 27 25   BUN 20.0 22.0 20.8   CR 0.96 0.90 0.81   ANIONGAP 8 8 10   SUNIL 9.0 9.1 9.4   GLC 87 88 104*     Most Recent 2 LFT's:  Recent Labs   Lab Test 07/19/24  1108 10/09/23  1122   AST 22 23   ALT 22 19   ALKPHOS 66 68   BILITOTAL 0.3 0.3     Most Recent TSH and T4:  Recent Labs   Lab Test 10/21/22  0750   TSH 3.19     Most Recent Hemoglobin A1c:No lab results found.    ASSESSMENT/PLAN:  Hospice care patient  Vascular dementia without behavioral disturbance (H)  History of CVA (cerebrovascular accident)  Dysphagia, unspecified type  Excessive oral secretions  Appreciate dedicated staff and [facility] (started May 2025)  Continues on modified diet with staff assistance with oral intake for comfort  Bedside oral suction with oral Yankauer after meals and as needed at bedtime or PRN  Continue hospice support meds; has PRN Morphine available using sparingly and staff monitoring for pain  I renewed orders for PRN Lorazepam for comfort as had fallen off of MAR after 14 days  For now continue ASA, Sertraline and Senna-S as only " scheduled meds; discontinue if difficulty with pills  Expect further weight loss and risk for aspiration but continue comfort support on site at LTC facility      Electronically signed by:  Roxann Vann DO

## 2025-06-13 NOTE — LETTER
6/13/2025      Yue PAN Defiance  C/o Amanda Defiance  [address]   [facility] [address]        No notes on file      Sincerely,        Roxann Vann, DO    Electronically signed   "\"observed making crying-like sounds\". Per progress note, staff repositioned him but he was still in pain so appropriate PRN Morphine and Lorazepam were administered which then led to him appearing more relaxed and was able to return to sleep. On 6/11 noted he had a 1:1 visit with TR in which they looked through a history of football picture book at dining room table. Per notes, \"Staff prompted discussion. Resident made eye contact and tracked book's contents visually but verbalized infrequently. Staff concluded visit after res fell asleep. Visit length was 15 minutes.\"      Past Medical History:   Diagnosis Date     Constipation      Dementia (H)      Depression      Dysphagia      Hip fracture (H) 06/25/2023    right     History of CVA (cerebrovascular accident)         CODE STATUS: DNR/I/H - [facility] since May 2025    ALLERGIES: Chlorpromazine; Fluphenazine; and Tuberculin, ppd    MEDICATIONS: Reviewed and updated in [facility] according to facility MAR  Current Outpatient Medications   Medication Sig Dispense Refill     acetaminophen (TYLENOL) 500 MG tablet Take 1,000 mg by mouth 3 times daily as needed for mild pain.       ammonium lactate (AMLACTIN) 12 % external cream Apply topically daily as needed for dry skin (on heels)       aspirin 81 MG EC tablet Take 81 mg by mouth daily       hyoscyamine 0.125 MG TBDP Take 0.125 mg by mouth every 4 hours as needed (secretions).       LORazepam (ATIVAN) 2 MG/ML (HIGH CONC) oral solution Take 0.5 mg by mouth every 4 hours as needed for agitation.       morphine sulfate (ROXANOL) 20 mg/mL (HIGH CONC) soln Take 5 mg by mouth every 4 hours as needed for shortness of breath or breakthrough pain.       ondansetron (ZOFRAN) 4 MG tablet Take 1 tablet (4 mg) by mouth every 6 hours as needed for nausea. 30 tablet 3     senna-docusate (SENOKOT-S/PERICOLACE) 8.6-50 MG tablet Take 1 tablet by mouth 2 times daily as needed for constipation       senna-docusate (SENOKOT-S/PERICOLACE) " "8.6-50 MG tablet Take 1 tablet by mouth 2 times daily       sertraline (ZOLOFT) 50 MG tablet Take 50 mg by mouth daily         ROS:  Unobtainable secondary to cognitive impairment and aphasia    Exam:  /76   Pulse 100   Temp 97.9  F (36.6  C)   Resp 18   Ht 1.626 m (5' 4\")   Wt 62.3 kg (137 lb 4.8 oz)   SpO2 94%   BMI 23.57 kg/m    Awake but sleepy, resting comfortably in bed, good hygiene  Moist oral mucosa with some mild secretions noted but no gurgling  HRRR without mrg  Breathing non-labored on RA, no cough  Abdomen thin  Trace bilateral sock line edema  Seems calm/content  Hard to answer direct questions; mumbled dysarthric voice    Lab/Diagnostic Data:    Most Recent 3 CBC's:  Recent Labs   Lab Test 10/18/24  1112 07/19/24  1108 05/01/24  1026   WBC 6.7 7.1 6.6   HGB 11.9* 11.1* 11.4*   * 103* 102*    203 219     Most Recent 3 BMP's:  Recent Labs   Lab Test 10/18/24  1112 07/19/24  1108 10/09/23  1122    141 140   POTASSIUM 4.1 4.1 4.0   CHLORIDE 107 106 105   CO2 26 27 25   BUN 20.0 22.0 20.8   CR 0.96 0.90 0.81   ANIONGAP 8 8 10   SUNIL 9.0 9.1 9.4   GLC 87 88 104*     Most Recent 2 LFT's:  Recent Labs   Lab Test 07/19/24  1108 10/09/23  1122   AST 22 23   ALT 22 19   ALKPHOS 66 68   BILITOTAL 0.3 0.3     Most Recent TSH and T4:  Recent Labs   Lab Test 10/21/22  0750   TSH 3.19     Most Recent Hemoglobin A1c:No lab results found.    ASSESSMENT/PLAN:  Hospice care patient  Vascular dementia without behavioral disturbance (H)  History of CVA (cerebrovascular accident)  Dysphagia, unspecified type  Excessive oral secretions  Appreciate dedicated staff and [facility] (started May 2025)  Continues on modified diet with staff assistance with oral intake for comfort  Bedside oral suction with oral Yankauer after meals and as needed at bedtime or PRN  Continue hospice support meds; has PRN Morphine available using sparingly and staff monitoring for pain  I renewed orders for PRN " Lorazepam for comfort as had fallen off of MAR after 14 days  For now continue ASA, Sertraline and Senna-S as only scheduled meds; discontinue if difficulty with pills  Expect further weight loss and risk for aspiration but continue comfort support on site at LTC facility      Electronically signed by:  Roxann Vann DO      Sincerely,        Roxann Vann DO    Electronically signed

## 2025-06-17 PROBLEM — Z51.5 HOSPICE CARE PATIENT: Status: ACTIVE | Noted: 2025-06-17

## 2025-08-07 ENCOUNTER — NURSING HOME VISIT (OUTPATIENT)
Dept: GERIATRICS | Facility: CLINIC | Age: 85
End: 2025-08-07
Payer: OTHER MISCELLANEOUS

## 2025-08-07 VITALS
BODY MASS INDEX: 22.86 KG/M2 | DIASTOLIC BLOOD PRESSURE: 56 MMHG | RESPIRATION RATE: 13 BRPM | HEART RATE: 67 BPM | OXYGEN SATURATION: 98 % | SYSTOLIC BLOOD PRESSURE: 123 MMHG | WEIGHT: 133.9 LBS | TEMPERATURE: 98 F | HEIGHT: 64 IN

## 2025-08-07 DIAGNOSIS — R62.7 FAILURE TO THRIVE IN ADULT: ICD-10-CM

## 2025-08-07 DIAGNOSIS — K59.00 CONSTIPATION, UNSPECIFIED CONSTIPATION TYPE: ICD-10-CM

## 2025-08-07 DIAGNOSIS — Z86.73 HISTORY OF CVA (CEREBROVASCULAR ACCIDENT): ICD-10-CM

## 2025-08-07 DIAGNOSIS — E78.5 HYPERLIPIDEMIA, UNSPECIFIED HYPERLIPIDEMIA TYPE: ICD-10-CM

## 2025-08-07 DIAGNOSIS — R11.2 NAUSEA AND VOMITING, UNSPECIFIED VOMITING TYPE: ICD-10-CM

## 2025-08-07 DIAGNOSIS — R09.89 PULMONARY CONGESTION: ICD-10-CM

## 2025-08-07 DIAGNOSIS — R13.10 DYSPHAGIA, UNSPECIFIED TYPE: ICD-10-CM

## 2025-08-07 DIAGNOSIS — Z51.5 HOSPICE CARE PATIENT: Primary | ICD-10-CM

## 2025-08-07 DIAGNOSIS — F43.10 PTSD (POST-TRAUMATIC STRESS DISORDER): ICD-10-CM

## 2025-08-07 DIAGNOSIS — I10 HTN (HYPERTENSION), BENIGN: ICD-10-CM

## 2025-08-07 DIAGNOSIS — R68.89 EXCESSIVE ORAL SECRETIONS: ICD-10-CM

## 2025-08-07 DIAGNOSIS — F01.C0 SEVERE VASCULAR DEMENTIA WITHOUT BEHAVIORAL DISTURBANCE, PSYCHOTIC DISTURBANCE, MOOD DISTURBANCE, OR ANXIETY (H): ICD-10-CM

## 2025-08-07 RX ORDER — DIVALPROEX SODIUM 125 MG/1
125 TABLET, DELAYED RELEASE ORAL 2 TIMES DAILY
COMMUNITY